# Patient Record
Sex: FEMALE | Race: WHITE
[De-identification: names, ages, dates, MRNs, and addresses within clinical notes are randomized per-mention and may not be internally consistent; named-entity substitution may affect disease eponyms.]

---

## 2018-02-21 ENCOUNTER — HOSPITAL ENCOUNTER (EMERGENCY)
Dept: HOSPITAL 94 - ER | Age: 38
Discharge: HOME | End: 2018-02-21
Payer: MEDICARE

## 2018-02-21 VITALS — BODY MASS INDEX: 36.73 KG/M2 | HEIGHT: 65 IN | WEIGHT: 220.46 LBS

## 2018-02-21 VITALS — SYSTOLIC BLOOD PRESSURE: 120 MMHG | DIASTOLIC BLOOD PRESSURE: 72 MMHG

## 2018-02-21 DIAGNOSIS — Z88.1: ICD-10-CM

## 2018-02-21 DIAGNOSIS — R33.9: ICD-10-CM

## 2018-02-21 DIAGNOSIS — K21.9: ICD-10-CM

## 2018-02-21 DIAGNOSIS — N18.9: ICD-10-CM

## 2018-02-21 DIAGNOSIS — K59.00: Primary | ICD-10-CM

## 2018-02-21 DIAGNOSIS — Z88.8: ICD-10-CM

## 2018-02-21 DIAGNOSIS — Z88.6: ICD-10-CM

## 2018-02-21 DIAGNOSIS — Z88.0: ICD-10-CM

## 2018-02-21 LAB
ALBUMIN SERPL BCP-MCNC: 3.6 G/DL (ref 3.4–5)
ANION GAP SERPL CALCULATED.3IONS-SCNC: 7 MMOL/L (ref 8–16)
BASOPHILS # BLD AUTO: 0.1 X10'3 (ref 0–0.2)
BASOPHILS NFR BLD AUTO: 0.9 % (ref 0–1)
BUN SERPL-MCNC: 15 MG/DL (ref 7–18)
BUN/CREAT SERPL: 16.3 (ref 6.6–38)
CALCIUM SERPL-MCNC: 8.9 MG/DL (ref 8.5–10.1)
CHLORIDE SERPL-SCNC: 104 MMOL/L (ref 99–107)
CLARITY UR: CLEAR
CO2 SERPL-SCNC: 30.3 MMOL/L (ref 24–32)
COLOR UR: YELLOW
CREAT SERPL-MCNC: 0.92 MG/DL (ref 0.4–0.9)
EOSINOPHIL # BLD AUTO: 0.3 X10'3 (ref 0–0.9)
EOSINOPHIL NFR BLD AUTO: 3.7 % (ref 0–6)
ERYTHROCYTE [DISTWIDTH] IN BLOOD BY AUTOMATED COUNT: 13.7 % (ref 11.5–14.5)
GFR SERPL CREATININE-BSD FRML MDRD: 68 ML/MIN
GLUCOSE SERPL-MCNC: 101 MG/DL (ref 70–104)
GLUCOSE UR STRIP-MCNC: NEGATIVE MG/DL
HCG UR QL: NEGATIVE
HCT VFR BLD AUTO: 39.8 % (ref 35–45)
HGB BLD-MCNC: 13.4 G/DL (ref 12–16)
HGB UR QL STRIP: NEGATIVE
KETONES UR STRIP-MCNC: NEGATIVE MG/DL
LEUKOCYTE ESTERASE UR QL STRIP: NEGATIVE
LYMPHOCYTES # BLD AUTO: 2.8 X10'3 (ref 1.1–4.8)
LYMPHOCYTES NFR BLD AUTO: 36.1 % (ref 21–51)
MCH RBC QN AUTO: 28.6 PG (ref 27–31)
MCHC RBC AUTO-ENTMCNC: 33.8 % (ref 33–36.5)
MCV RBC AUTO: 84.7 FL (ref 78–98)
MONOCYTES # BLD AUTO: 0.7 X10'3 (ref 0–0.9)
MONOCYTES NFR BLD AUTO: 9.6 % (ref 2–12)
NEUTROPHILS # BLD AUTO: 3.8 X10'3 (ref 1.8–7.7)
NEUTROPHILS NFR BLD AUTO: 49.7 % (ref 42–75)
NITRITE UR QL STRIP: NEGATIVE
PH UR STRIP: 6.5 [PH] (ref 4.8–8)
PLATELET # BLD AUTO: 264 X10'3 (ref 140–440)
PMV BLD AUTO: 8.4 FL (ref 7.4–10.4)
POTASSIUM SERPL-SCNC: 3.6 MMOL/L (ref 3.5–5.1)
PROT UR QL STRIP: NEGATIVE MG/DL
RBC # BLD AUTO: 4.7 X10'6 (ref 4.2–5.6)
SODIUM SERPL-SCNC: 141 MMOL/L (ref 135–145)
SP GR UR STRIP: 1.01 (ref 1–1.03)
URN COLLECT METHOD CLASS: (no result)
UROBILINOGEN UR STRIP-MCNC: 0.2 E.U/DL (ref 0.2–1)
WBC # BLD AUTO: 7.7 X10'3 (ref 4.5–11)

## 2018-02-21 PROCEDURE — 74177 CT ABD & PELVIS W/CONTRAST: CPT

## 2018-02-21 PROCEDURE — 85025 COMPLETE CBC W/AUTO DIFF WBC: CPT

## 2018-02-21 PROCEDURE — 81003 URINALYSIS AUTO W/O SCOPE: CPT

## 2018-02-21 PROCEDURE — 81025 URINE PREGNANCY TEST: CPT

## 2018-02-21 PROCEDURE — 80048 BASIC METABOLIC PNL TOTAL CA: CPT

## 2018-02-21 PROCEDURE — 96360 HYDRATION IV INFUSION INIT: CPT

## 2018-02-21 PROCEDURE — 36415 COLL VENOUS BLD VENIPUNCTURE: CPT

## 2018-02-21 PROCEDURE — 99285 EMERGENCY DEPT VISIT HI MDM: CPT

## 2018-02-21 PROCEDURE — 76856 US EXAM PELVIC COMPLETE: CPT

## 2018-02-21 PROCEDURE — 51702 INSERT TEMP BLADDER CATH: CPT

## 2018-04-10 ENCOUNTER — HOSPITAL ENCOUNTER (EMERGENCY)
Dept: HOSPITAL 94 - ER | Age: 38
Discharge: HOME | End: 2018-04-10
Payer: MEDICARE

## 2018-04-10 VITALS — DIASTOLIC BLOOD PRESSURE: 85 MMHG | SYSTOLIC BLOOD PRESSURE: 143 MMHG

## 2018-04-10 VITALS — BODY MASS INDEX: 38.4 KG/M2 | WEIGHT: 230.49 LBS | HEIGHT: 65 IN

## 2018-04-10 DIAGNOSIS — Z88.2: ICD-10-CM

## 2018-04-10 DIAGNOSIS — R42: ICD-10-CM

## 2018-04-10 DIAGNOSIS — Z98.890: ICD-10-CM

## 2018-04-10 DIAGNOSIS — Z88.8: ICD-10-CM

## 2018-04-10 DIAGNOSIS — F41.9: ICD-10-CM

## 2018-04-10 DIAGNOSIS — Z79.899: ICD-10-CM

## 2018-04-10 DIAGNOSIS — K21.9: ICD-10-CM

## 2018-04-10 DIAGNOSIS — J32.9: Primary | ICD-10-CM

## 2018-04-10 DIAGNOSIS — Z88.5: ICD-10-CM

## 2018-04-10 DIAGNOSIS — Z88.0: ICD-10-CM

## 2018-04-10 LAB
ALBUMIN SERPL BCP-MCNC: 3.4 G/DL (ref 3.4–5)
ALBUMIN/GLOB SERPL: 0.9 {RATIO} (ref 1.1–1.5)
ALP SERPL-CCNC: 69 IU/L (ref 46–116)
ALT SERPL W P-5'-P-CCNC: 37 U/L (ref 12–78)
ANION GAP SERPL CALCULATED.3IONS-SCNC: 9 MMOL/L (ref 8–16)
AST SERPL W P-5'-P-CCNC: 24 U/L (ref 10–37)
BASOPHILS # BLD AUTO: 0.1 X10'3 (ref 0–0.2)
BASOPHILS NFR BLD AUTO: 0.5 % (ref 0–1)
BILIRUB SERPL-MCNC: 0.2 MG/DL (ref 0.1–1)
BUN SERPL-MCNC: 14 MG/DL (ref 7–18)
BUN/CREAT SERPL: 16.5 (ref 6.6–38)
CALCIUM SERPL-MCNC: 8.4 MG/DL (ref 8.5–10.1)
CHLORIDE SERPL-SCNC: 103 MMOL/L (ref 99–107)
CO2 SERPL-SCNC: 26.6 MMOL/L (ref 24–32)
CREAT SERPL-MCNC: 0.85 MG/DL (ref 0.4–0.9)
EOSINOPHIL # BLD AUTO: 0.4 X10'3 (ref 0–0.9)
EOSINOPHIL NFR BLD AUTO: 3.4 % (ref 0–6)
ERYTHROCYTE [DISTWIDTH] IN BLOOD BY AUTOMATED COUNT: 14.3 % (ref 11.5–14.5)
GFR SERPL CREATININE-BSD FRML MDRD: 75 ML/MIN
GLUCOSE SERPL-MCNC: 90 MG/DL (ref 70–104)
HCT VFR BLD AUTO: 36 % (ref 35–45)
HGB BLD-MCNC: 12.6 G/DL (ref 12–16)
LYMPHOCYTES # BLD AUTO: 3 X10'3 (ref 1.1–4.8)
LYMPHOCYTES NFR BLD AUTO: 26.6 % (ref 21–51)
MCH RBC QN AUTO: 30 PG (ref 27–31)
MCHC RBC AUTO-ENTMCNC: 35 % (ref 33–36.5)
MCV RBC AUTO: 85.6 FL (ref 78–98)
MONOCYTES # BLD AUTO: 0.8 X10'3 (ref 0–0.9)
MONOCYTES NFR BLD AUTO: 7.4 % (ref 2–12)
NEUTROPHILS # BLD AUTO: 7 X10'3 (ref 1.8–7.7)
NEUTROPHILS NFR BLD AUTO: 62.1 % (ref 42–75)
PLATELET # BLD AUTO: 278 X10'3 (ref 140–440)
PMV BLD AUTO: 8.1 FL (ref 7.4–10.4)
POTASSIUM SERPL-SCNC: 3.7 MMOL/L (ref 3.5–5.1)
PROT SERPL-MCNC: 7.1 G/DL (ref 6.4–8.2)
RBC # BLD AUTO: 4.21 X10'6 (ref 4.2–5.6)
SODIUM SERPL-SCNC: 139 MMOL/L (ref 135–145)
WBC # BLD AUTO: 11.2 X10'3 (ref 4.5–11)

## 2018-04-10 PROCEDURE — 85025 COMPLETE CBC W/AUTO DIFF WBC: CPT

## 2018-04-10 PROCEDURE — 70491 CT SOFT TISSUE NECK W/DYE: CPT

## 2018-04-10 PROCEDURE — 70450 CT HEAD/BRAIN W/O DYE: CPT

## 2018-04-10 PROCEDURE — 99285 EMERGENCY DEPT VISIT HI MDM: CPT

## 2018-04-10 PROCEDURE — 36415 COLL VENOUS BLD VENIPUNCTURE: CPT

## 2018-04-10 PROCEDURE — 96374 THER/PROPH/DIAG INJ IV PUSH: CPT

## 2018-04-10 PROCEDURE — 80053 COMPREHEN METABOLIC PANEL: CPT

## 2018-04-17 ENCOUNTER — HOSPITAL ENCOUNTER (EMERGENCY)
Dept: HOSPITAL 94 - ER | Age: 38
Discharge: HOME | End: 2018-04-17
Payer: MEDICARE

## 2018-04-17 VITALS — SYSTOLIC BLOOD PRESSURE: 134 MMHG | DIASTOLIC BLOOD PRESSURE: 76 MMHG

## 2018-04-17 VITALS — WEIGHT: 236.12 LBS | HEIGHT: 65 IN | BODY MASS INDEX: 39.34 KG/M2

## 2018-04-17 DIAGNOSIS — Z88.8: ICD-10-CM

## 2018-04-17 DIAGNOSIS — Z88.2: ICD-10-CM

## 2018-04-17 DIAGNOSIS — J32.9: Primary | ICD-10-CM

## 2018-04-17 DIAGNOSIS — Z88.0: ICD-10-CM

## 2018-04-17 DIAGNOSIS — Z79.899: ICD-10-CM

## 2018-04-17 DIAGNOSIS — K21.9: ICD-10-CM

## 2018-04-17 DIAGNOSIS — H53.9: ICD-10-CM

## 2018-04-17 DIAGNOSIS — Z98.890: ICD-10-CM

## 2018-04-17 PROCEDURE — 99283 EMERGENCY DEPT VISIT LOW MDM: CPT

## 2018-05-01 ENCOUNTER — HOSPITAL ENCOUNTER (EMERGENCY)
Dept: HOSPITAL 94 - ER | Age: 38
Discharge: HOME | End: 2018-05-01
Payer: MEDICARE

## 2018-05-01 VITALS — WEIGHT: 231.49 LBS | HEIGHT: 65 IN | BODY MASS INDEX: 38.57 KG/M2

## 2018-05-01 VITALS — SYSTOLIC BLOOD PRESSURE: 127 MMHG | DIASTOLIC BLOOD PRESSURE: 81 MMHG

## 2018-05-01 DIAGNOSIS — Z98.890: ICD-10-CM

## 2018-05-01 DIAGNOSIS — K08.89: Primary | ICD-10-CM

## 2018-05-01 DIAGNOSIS — Z88.2: ICD-10-CM

## 2018-05-01 DIAGNOSIS — Z88.0: ICD-10-CM

## 2018-05-01 DIAGNOSIS — K21.9: ICD-10-CM

## 2018-05-01 DIAGNOSIS — Z79.899: ICD-10-CM

## 2018-05-01 PROCEDURE — 99283 EMERGENCY DEPT VISIT LOW MDM: CPT

## 2018-06-17 ENCOUNTER — HOSPITAL ENCOUNTER (EMERGENCY)
Dept: HOSPITAL 94 - ER | Age: 38
Discharge: HOME | End: 2018-06-17
Payer: MEDICARE

## 2018-06-17 VITALS — WEIGHT: 234.57 LBS | BODY MASS INDEX: 39.08 KG/M2 | HEIGHT: 65 IN

## 2018-06-17 DIAGNOSIS — Z79.899: ICD-10-CM

## 2018-06-17 DIAGNOSIS — Z88.2: ICD-10-CM

## 2018-06-17 DIAGNOSIS — Z98.890: ICD-10-CM

## 2018-06-17 DIAGNOSIS — Z79.2: ICD-10-CM

## 2018-06-17 DIAGNOSIS — Z88.5: ICD-10-CM

## 2018-06-17 DIAGNOSIS — Z88.8: ICD-10-CM

## 2018-06-17 DIAGNOSIS — Z88.0: ICD-10-CM

## 2018-06-17 DIAGNOSIS — Z88.1: ICD-10-CM

## 2018-06-17 DIAGNOSIS — R11.0: ICD-10-CM

## 2018-06-17 DIAGNOSIS — K08.89: Primary | ICD-10-CM

## 2018-06-17 DIAGNOSIS — K21.9: ICD-10-CM

## 2018-06-17 PROCEDURE — 96372 THER/PROPH/DIAG INJ SC/IM: CPT

## 2018-06-17 PROCEDURE — 99283 EMERGENCY DEPT VISIT LOW MDM: CPT

## 2018-11-02 ENCOUNTER — HOSPITAL ENCOUNTER (EMERGENCY)
Dept: HOSPITAL 94 - ER | Age: 38
Discharge: HOME | End: 2018-11-02
Payer: MEDICARE

## 2018-11-02 VITALS — BODY MASS INDEX: 38.93 KG/M2 | HEIGHT: 65 IN | WEIGHT: 233.69 LBS

## 2018-11-02 VITALS — DIASTOLIC BLOOD PRESSURE: 91 MMHG | SYSTOLIC BLOOD PRESSURE: 142 MMHG

## 2018-11-02 DIAGNOSIS — Z88.6: ICD-10-CM

## 2018-11-02 DIAGNOSIS — R42: Primary | ICD-10-CM

## 2018-11-02 DIAGNOSIS — Z98.890: ICD-10-CM

## 2018-11-02 DIAGNOSIS — Z88.2: ICD-10-CM

## 2018-11-02 DIAGNOSIS — Z88.1: ICD-10-CM

## 2018-11-02 DIAGNOSIS — K21.9: ICD-10-CM

## 2018-11-02 DIAGNOSIS — Z88.0: ICD-10-CM

## 2018-11-02 DIAGNOSIS — Z90.89: ICD-10-CM

## 2018-11-02 DIAGNOSIS — Z79.899: ICD-10-CM

## 2018-11-02 LAB
ALBUMIN SERPL BCP-MCNC: 3.2 G/DL (ref 3.4–5)
ALBUMIN/GLOB SERPL: 0.8 {RATIO} (ref 1.1–1.5)
ALP SERPL-CCNC: 77 IU/L (ref 46–116)
ALT SERPL W P-5'-P-CCNC: 47 U/L (ref 12–78)
ANION GAP SERPL CALCULATED.3IONS-SCNC: 8 MMOL/L (ref 8–16)
AST SERPL W P-5'-P-CCNC: 27 U/L (ref 10–37)
BASOPHILS # BLD AUTO: 0.1 X10'3 (ref 0–0.2)
BASOPHILS NFR BLD AUTO: 0.5 % (ref 0–1)
BILIRUB SERPL-MCNC: 0.1 MG/DL (ref 0.1–1)
BUN SERPL-MCNC: 16 MG/DL (ref 7–18)
BUN/CREAT SERPL: 17.6 (ref 6.6–38)
CALCIUM SERPL-MCNC: 8.6 MG/DL (ref 8.5–10.1)
CHLORIDE SERPL-SCNC: 104 MMOL/L (ref 99–107)
CLARITY UR: CLEAR
CO2 SERPL-SCNC: 28.7 MMOL/L (ref 24–32)
COLOR UR: YELLOW
CREAT SERPL-MCNC: 0.91 MG/DL (ref 0.4–0.9)
EOSINOPHIL # BLD AUTO: 0.3 X10'3 (ref 0–0.9)
EOSINOPHIL NFR BLD AUTO: 3.5 % (ref 0–6)
ERYTHROCYTE [DISTWIDTH] IN BLOOD BY AUTOMATED COUNT: 13.3 % (ref 11.5–14.5)
GFR SERPL CREATININE-BSD FRML MDRD: 69 ML/MIN
GLUCOSE SERPL-MCNC: 88 MG/DL (ref 70–104)
GLUCOSE UR STRIP-MCNC: NEGATIVE MG/DL
HCT VFR BLD AUTO: 38.8 % (ref 35–45)
HGB BLD-MCNC: 12.9 G/DL (ref 12–16)
HGB UR QL STRIP: NEGATIVE
KETONES UR STRIP-MCNC: (no result) MG/DL
LEUKOCYTE ESTERASE UR QL STRIP: NEGATIVE
LYMPHOCYTES # BLD AUTO: 2.9 X10'3 (ref 1.1–4.8)
LYMPHOCYTES NFR BLD AUTO: 30.9 % (ref 21–51)
MCH RBC QN AUTO: 29.1 PG (ref 27–31)
MCHC RBC AUTO-ENTMCNC: 33.2 % (ref 33–36.5)
MCV RBC AUTO: 87.8 FL (ref 78–98)
MONOCYTES # BLD AUTO: 0.7 X10'3 (ref 0–0.9)
MONOCYTES NFR BLD AUTO: 8 % (ref 2–12)
NEUTROPHILS # BLD AUTO: 5.3 X10'3 (ref 1.8–7.7)
NEUTROPHILS NFR BLD AUTO: 57.1 % (ref 42–75)
NITRITE UR QL STRIP: NEGATIVE
PH UR STRIP: 6.5 [PH] (ref 4.8–8)
PLATELET # BLD AUTO: 346 X10'3 (ref 140–440)
PMV BLD AUTO: 8 FL (ref 7.4–10.4)
POTASSIUM SERPL-SCNC: 3.5 MMOL/L (ref 3.5–5.1)
PROT SERPL-MCNC: 7.1 G/DL (ref 6.4–8.2)
PROT UR QL STRIP: NEGATIVE MG/DL
RBC # BLD AUTO: 4.42 X10'6 (ref 4.2–5.6)
SODIUM SERPL-SCNC: 141 MMOL/L (ref 135–145)
SP GR UR STRIP: 1.02 (ref 1–1.03)
URN COLLECT METHOD CLASS: (no result)
UROBILINOGEN UR STRIP-MCNC: 0.2 E.U/DL (ref 0.2–1)
WBC # BLD AUTO: 9.3 X10'3 (ref 4.5–11)

## 2018-11-02 PROCEDURE — 96361 HYDRATE IV INFUSION ADD-ON: CPT

## 2018-11-02 PROCEDURE — 80053 COMPREHEN METABOLIC PANEL: CPT

## 2018-11-02 PROCEDURE — 96374 THER/PROPH/DIAG INJ IV PUSH: CPT

## 2018-11-02 PROCEDURE — 71045 X-RAY EXAM CHEST 1 VIEW: CPT

## 2018-11-02 PROCEDURE — 81003 URINALYSIS AUTO W/O SCOPE: CPT

## 2018-11-02 PROCEDURE — 85025 COMPLETE CBC W/AUTO DIFF WBC: CPT

## 2018-11-02 PROCEDURE — 36415 COLL VENOUS BLD VENIPUNCTURE: CPT

## 2018-11-02 PROCEDURE — 99285 EMERGENCY DEPT VISIT HI MDM: CPT

## 2018-11-02 PROCEDURE — 70450 CT HEAD/BRAIN W/O DYE: CPT

## 2018-11-02 RX ADMIN — FENTANYL CITRATE ONE MCG: 50 INJECTION, SOLUTION INTRAMUSCULAR; INTRAVENOUS at 16:05

## 2018-11-02 RX ADMIN — FENTANYL CITRATE ONE MCG: 50 INJECTION, SOLUTION INTRAMUSCULAR; INTRAVENOUS at 15:56

## 2018-11-12 ENCOUNTER — HOSPITAL ENCOUNTER (EMERGENCY)
Dept: HOSPITAL 94 - ER | Age: 38
LOS: 1 days | Discharge: HOME | End: 2018-11-13
Payer: MEDICARE

## 2018-11-12 VITALS — BODY MASS INDEX: 39.24 KG/M2 | HEIGHT: 65 IN | WEIGHT: 235.5 LBS

## 2018-11-12 DIAGNOSIS — Z88.2: ICD-10-CM

## 2018-11-12 DIAGNOSIS — Z79.899: ICD-10-CM

## 2018-11-12 DIAGNOSIS — J01.90: Primary | ICD-10-CM

## 2018-11-12 DIAGNOSIS — Z88.1: ICD-10-CM

## 2018-11-12 DIAGNOSIS — Z98.890: ICD-10-CM

## 2018-11-12 DIAGNOSIS — Z90.89: ICD-10-CM

## 2018-11-12 DIAGNOSIS — Z88.0: ICD-10-CM

## 2018-11-12 DIAGNOSIS — Z88.6: ICD-10-CM

## 2018-11-12 DIAGNOSIS — K21.9: ICD-10-CM

## 2018-11-12 PROCEDURE — 96372 THER/PROPH/DIAG INJ SC/IM: CPT

## 2018-11-12 PROCEDURE — 99283 EMERGENCY DEPT VISIT LOW MDM: CPT

## 2018-11-13 VITALS — SYSTOLIC BLOOD PRESSURE: 169 MMHG | DIASTOLIC BLOOD PRESSURE: 105 MMHG

## 2018-11-25 ENCOUNTER — HOSPITAL ENCOUNTER (EMERGENCY)
Dept: HOSPITAL 94 - ER | Age: 38
Discharge: HOME | End: 2018-11-25
Payer: MEDICARE

## 2018-11-25 VITALS — BODY MASS INDEX: 38.57 KG/M2 | HEIGHT: 65 IN | WEIGHT: 231.49 LBS

## 2018-11-25 VITALS — SYSTOLIC BLOOD PRESSURE: 153 MMHG | DIASTOLIC BLOOD PRESSURE: 89 MMHG

## 2018-11-25 DIAGNOSIS — Z88.5: ICD-10-CM

## 2018-11-25 DIAGNOSIS — R51: Primary | ICD-10-CM

## 2018-11-25 DIAGNOSIS — Z88.1: ICD-10-CM

## 2018-11-25 DIAGNOSIS — Z88.2: ICD-10-CM

## 2018-11-25 DIAGNOSIS — Z88.0: ICD-10-CM

## 2018-11-25 DIAGNOSIS — Z88.8: ICD-10-CM

## 2018-11-25 DIAGNOSIS — Z79.899: ICD-10-CM

## 2018-11-25 DIAGNOSIS — Z79.2: ICD-10-CM

## 2018-11-25 DIAGNOSIS — K21.9: ICD-10-CM

## 2018-11-25 DIAGNOSIS — Z98.890: ICD-10-CM

## 2018-11-25 PROCEDURE — 99284 EMERGENCY DEPT VISIT MOD MDM: CPT

## 2019-01-07 ENCOUNTER — HOSPITAL ENCOUNTER (EMERGENCY)
Dept: HOSPITAL 94 - ER | Age: 39
Discharge: HOME | End: 2019-01-07
Payer: MEDICARE

## 2019-01-07 VITALS — HEIGHT: 65 IN | BODY MASS INDEX: 34.01 KG/M2 | WEIGHT: 204.15 LBS

## 2019-01-07 VITALS — DIASTOLIC BLOOD PRESSURE: 111 MMHG | SYSTOLIC BLOOD PRESSURE: 166 MMHG

## 2019-01-07 DIAGNOSIS — R51: Primary | ICD-10-CM

## 2019-01-07 DIAGNOSIS — Z88.0: ICD-10-CM

## 2019-01-07 DIAGNOSIS — Z98.890: ICD-10-CM

## 2019-01-07 DIAGNOSIS — K21.9: ICD-10-CM

## 2019-01-07 DIAGNOSIS — Z79.2: ICD-10-CM

## 2019-01-07 DIAGNOSIS — Z88.8: ICD-10-CM

## 2019-01-07 DIAGNOSIS — Z88.1: ICD-10-CM

## 2019-01-07 DIAGNOSIS — R11.0: ICD-10-CM

## 2019-01-07 DIAGNOSIS — Z79.899: ICD-10-CM

## 2019-01-07 DIAGNOSIS — Z88.5: ICD-10-CM

## 2019-01-07 DIAGNOSIS — Z88.2: ICD-10-CM

## 2019-01-07 PROCEDURE — 99284 EMERGENCY DEPT VISIT MOD MDM: CPT

## 2019-01-26 ENCOUNTER — HOSPITAL ENCOUNTER (EMERGENCY)
Dept: HOSPITAL 94 - ER | Age: 39
Discharge: HOME | End: 2019-01-26
Payer: MEDICARE

## 2019-01-26 VITALS — HEIGHT: 65 IN | WEIGHT: 231.93 LBS | BODY MASS INDEX: 38.64 KG/M2

## 2019-01-26 VITALS — SYSTOLIC BLOOD PRESSURE: 154 MMHG | DIASTOLIC BLOOD PRESSURE: 90 MMHG

## 2019-01-26 DIAGNOSIS — K21.9: ICD-10-CM

## 2019-01-26 DIAGNOSIS — Z98.890: ICD-10-CM

## 2019-01-26 DIAGNOSIS — Z88.6: ICD-10-CM

## 2019-01-26 DIAGNOSIS — Z88.0: ICD-10-CM

## 2019-01-26 DIAGNOSIS — F41.9: ICD-10-CM

## 2019-01-26 DIAGNOSIS — Z79.899: ICD-10-CM

## 2019-01-26 DIAGNOSIS — R06.02: Primary | ICD-10-CM

## 2019-01-26 DIAGNOSIS — Z88.1: ICD-10-CM

## 2019-01-26 DIAGNOSIS — Z90.89: ICD-10-CM

## 2019-01-26 DIAGNOSIS — Z88.5: ICD-10-CM

## 2019-01-26 LAB
ALBUMIN SERPL BCP-MCNC: 3.2 G/DL (ref 3.4–5)
ALBUMIN/GLOB SERPL: 0.8 {RATIO} (ref 1.1–1.5)
ALP SERPL-CCNC: 77 IU/L (ref 46–116)
ALT SERPL W P-5'-P-CCNC: 28 U/L (ref 12–78)
ANION GAP SERPL CALCULATED.3IONS-SCNC: 9 MMOL/L (ref 8–16)
AST SERPL W P-5'-P-CCNC: 19 U/L (ref 10–37)
BASOPHILS # BLD AUTO: 0 X10'3 (ref 0–0.2)
BASOPHILS NFR BLD AUTO: 0.3 % (ref 0–1)
BILIRUB SERPL-MCNC: 0.3 MG/DL (ref 0.1–1)
BUN SERPL-MCNC: 13 MG/DL (ref 7–18)
BUN/CREAT SERPL: 17.3 (ref 6.6–38)
CALCIUM SERPL-MCNC: 8.4 MG/DL (ref 8.5–10.1)
CHLORIDE SERPL-SCNC: 102 MMOL/L (ref 99–107)
CO2 SERPL-SCNC: 28.4 MMOL/L (ref 24–32)
CREAT SERPL-MCNC: 0.75 MG/DL (ref 0.4–0.9)
D DIMER PPP FEU-MCNC: 0.32 MG/L FEU (ref 0–0.5)
EOSINOPHIL # BLD AUTO: 0.2 X10'3 (ref 0–0.9)
EOSINOPHIL NFR BLD AUTO: 2.3 % (ref 0–6)
ERYTHROCYTE [DISTWIDTH] IN BLOOD BY AUTOMATED COUNT: 14.7 % (ref 11.5–14.5)
GFR SERPL CREATININE-BSD FRML MDRD: 86 ML/MIN
GLUCOSE SERPL-MCNC: 101 MG/DL (ref 70–104)
HCT VFR BLD AUTO: 39.4 % (ref 35–45)
HGB BLD-MCNC: 13.2 G/DL (ref 12–16)
LYMPHOCYTES # BLD AUTO: 2.3 X10'3 (ref 1.1–4.8)
LYMPHOCYTES NFR BLD AUTO: 24.8 % (ref 21–51)
MCH RBC QN AUTO: 29.3 PG (ref 27–31)
MCHC RBC AUTO-ENTMCNC: 33.5 % (ref 33–36.5)
MCV RBC AUTO: 87.4 FL (ref 78–98)
MONOCYTES # BLD AUTO: 0.6 X10'3 (ref 0–0.9)
MONOCYTES NFR BLD AUTO: 6.4 % (ref 2–12)
NEUTROPHILS # BLD AUTO: 6.2 X10'3 (ref 1.8–7.7)
NEUTROPHILS NFR BLD AUTO: 66.2 % (ref 42–75)
PLATELET # BLD AUTO: 296 X10'3 (ref 140–440)
PMV BLD AUTO: 8.4 FL (ref 7.4–10.4)
POTASSIUM SERPL-SCNC: 3.8 MMOL/L (ref 3.5–5.1)
PROT SERPL-MCNC: 7.4 G/DL (ref 6.4–8.2)
RBC # BLD AUTO: 4.5 X10'6 (ref 4.2–5.6)
SODIUM SERPL-SCNC: 139 MMOL/L (ref 135–145)
WBC # BLD AUTO: 9.4 X10'3 (ref 4.5–11)

## 2019-01-26 PROCEDURE — 85025 COMPLETE CBC W/AUTO DIFF WBC: CPT

## 2019-01-26 PROCEDURE — 71045 X-RAY EXAM CHEST 1 VIEW: CPT

## 2019-01-26 PROCEDURE — 85379 FIBRIN DEGRADATION QUANT: CPT

## 2019-01-26 PROCEDURE — 99284 EMERGENCY DEPT VISIT MOD MDM: CPT

## 2019-01-26 PROCEDURE — 84443 ASSAY THYROID STIM HORMONE: CPT

## 2019-01-26 PROCEDURE — 80053 COMPREHEN METABOLIC PANEL: CPT

## 2019-01-26 PROCEDURE — 84439 ASSAY OF FREE THYROXINE: CPT

## 2019-01-26 PROCEDURE — 96374 THER/PROPH/DIAG INJ IV PUSH: CPT

## 2019-01-26 PROCEDURE — 36415 COLL VENOUS BLD VENIPUNCTURE: CPT

## 2019-01-26 PROCEDURE — 93005 ELECTROCARDIOGRAM TRACING: CPT

## 2019-02-20 ENCOUNTER — HOSPITAL ENCOUNTER (OUTPATIENT)
Dept: HOSPITAL 94 - RAD | Age: 39
Discharge: HOME | End: 2019-02-20
Attending: OTOLARYNGOLOGY
Payer: MEDICARE

## 2019-02-20 DIAGNOSIS — K21.9: ICD-10-CM

## 2019-02-20 DIAGNOSIS — Z88.5: ICD-10-CM

## 2019-02-20 DIAGNOSIS — Z88.0: ICD-10-CM

## 2019-02-20 DIAGNOSIS — Z88.2: ICD-10-CM

## 2019-02-20 DIAGNOSIS — R13.14: Primary | ICD-10-CM

## 2019-02-20 PROCEDURE — 74230 X-RAY XM SWLNG FUNCJ C+: CPT

## 2019-02-20 PROCEDURE — 74220 X-RAY XM ESOPHAGUS 1CNTRST: CPT

## 2019-08-23 ENCOUNTER — HOSPITAL ENCOUNTER (EMERGENCY)
Dept: HOSPITAL 94 - ER | Age: 39
LOS: 1 days | Discharge: HOME | End: 2019-08-24
Payer: MEDICAID

## 2019-08-23 VITALS — HEIGHT: 65 IN | BODY MASS INDEX: 40.04 KG/M2 | WEIGHT: 240.3 LBS

## 2019-08-23 VITALS — DIASTOLIC BLOOD PRESSURE: 115 MMHG | SYSTOLIC BLOOD PRESSURE: 159 MMHG

## 2019-08-23 DIAGNOSIS — Z88.6: ICD-10-CM

## 2019-08-23 DIAGNOSIS — Z79.2: ICD-10-CM

## 2019-08-23 DIAGNOSIS — Z79.899: ICD-10-CM

## 2019-08-23 DIAGNOSIS — R06.02: ICD-10-CM

## 2019-08-23 DIAGNOSIS — Z88.0: ICD-10-CM

## 2019-08-23 DIAGNOSIS — J02.9: Primary | ICD-10-CM

## 2019-08-23 DIAGNOSIS — Z88.2: ICD-10-CM

## 2019-08-23 DIAGNOSIS — Z88.1: ICD-10-CM

## 2019-08-23 PROCEDURE — 99283 EMERGENCY DEPT VISIT LOW MDM: CPT

## 2019-08-26 ENCOUNTER — HOSPITAL ENCOUNTER (EMERGENCY)
Dept: HOSPITAL 94 - ER | Age: 39
Discharge: HOME | End: 2019-08-26
Payer: MEDICAID

## 2019-08-26 VITALS — SYSTOLIC BLOOD PRESSURE: 143 MMHG | DIASTOLIC BLOOD PRESSURE: 87 MMHG

## 2019-08-26 VITALS — BODY MASS INDEX: 48.82 KG/M2 | WEIGHT: 293 LBS | HEIGHT: 65 IN

## 2019-08-26 DIAGNOSIS — Z88.8: ICD-10-CM

## 2019-08-26 DIAGNOSIS — Z88.6: ICD-10-CM

## 2019-08-26 DIAGNOSIS — K21.9: ICD-10-CM

## 2019-08-26 DIAGNOSIS — Z88.0: ICD-10-CM

## 2019-08-26 DIAGNOSIS — Z88.1: ICD-10-CM

## 2019-08-26 DIAGNOSIS — B34.9: Primary | ICD-10-CM

## 2019-08-26 LAB
ALBUMIN SERPL BCP-MCNC: 3.5 G/DL (ref 3.4–5)
ALBUMIN/GLOB SERPL: 0.9 {RATIO} (ref 1.1–1.5)
ALP SERPL-CCNC: 72 IU/L (ref 46–116)
ALT SERPL W P-5'-P-CCNC: 41 U/L (ref 12–78)
AMYLASE SERPL-CCNC: 45 U/L (ref 25–115)
ANION GAP SERPL CALCULATED.3IONS-SCNC: 8 MMOL/L (ref 8–16)
AST SERPL W P-5'-P-CCNC: 23 U/L (ref 10–37)
BACTERIA URNS QL MICRO: (no result) /HPF
BASOPHILS # BLD AUTO: 0.1 X10'3 (ref 0–0.2)
BASOPHILS NFR BLD AUTO: 0.8 % (ref 0–1)
BILIRUB SERPL-MCNC: 0.2 MG/DL (ref 0.1–1)
BUN SERPL-MCNC: 13 MG/DL (ref 7–18)
BUN/CREAT SERPL: 12.7 (ref 6.6–38)
CALCIUM SERPL-MCNC: 8.9 MG/DL (ref 8.5–10.1)
CHLORIDE SERPL-SCNC: 107 MMOL/L (ref 99–107)
CLARITY UR: (no result)
CO2 SERPL-SCNC: 28.5 MMOL/L (ref 24–32)
COLOR UR: YELLOW
CREAT SERPL-MCNC: 1.02 MG/DL (ref 0.4–0.9)
DEPRECATED SQUAMOUS URNS QL MICRO: (no result) /LPF
EOSINOPHIL # BLD AUTO: 0.3 X10'3 (ref 0–0.9)
EOSINOPHIL NFR BLD AUTO: 3.9 % (ref 0–6)
ERYTHROCYTE [DISTWIDTH] IN BLOOD BY AUTOMATED COUNT: 13.8 % (ref 11.5–14.5)
GFR SERPL CREATININE-BSD FRML MDRD: 60 ML/MIN
GLUCOSE SERPL-MCNC: 109 MG/DL (ref 70–104)
GLUCOSE UR STRIP-MCNC: NEGATIVE MG/DL
HCT VFR BLD AUTO: 38.5 % (ref 35–45)
HGB BLD-MCNC: 13.1 G/DL (ref 12–16)
HGB UR QL STRIP: (no result)
KETONES UR STRIP-MCNC: (no result) MG/DL
LEUKOCYTE ESTERASE UR QL STRIP: NEGATIVE
LIPASE SERPL-CCNC: 131 U/L (ref 73–393)
LYMPHOCYTES # BLD AUTO: 1.9 X10'3 (ref 1.1–4.8)
LYMPHOCYTES NFR BLD AUTO: 28.6 % (ref 21–51)
MCH RBC QN AUTO: 29.5 PG (ref 27–31)
MCHC RBC AUTO-ENTMCNC: 33.9 G/DL (ref 33–36.5)
MCV RBC AUTO: 87 FL (ref 78–98)
MONOCYTES # BLD AUTO: 0.5 X10'3 (ref 0–0.9)
MONOCYTES NFR BLD AUTO: 7.9 % (ref 2–12)
MUCOUS THREADS URNS QL MICRO: (no result) /LPF
NEUTROPHILS # BLD AUTO: 4 X10'3 (ref 1.8–7.7)
NEUTROPHILS NFR BLD AUTO: 58.8 % (ref 42–75)
NITRITE UR QL STRIP: NEGATIVE
PH UR STRIP: 6.5 [PH] (ref 4.8–8)
PLATELET # BLD AUTO: 300 X10'3 (ref 140–440)
PMV BLD AUTO: 8 FL (ref 7.4–10.4)
POTASSIUM SERPL-SCNC: 3.6 MMOL/L (ref 3.5–5.1)
PROT SERPL-MCNC: 7.3 G/DL (ref 6.4–8.2)
PROT UR QL STRIP: (no result) MG/DL
RBC # BLD AUTO: 4.42 X10'6 (ref 4.2–5.6)
SODIUM SERPL-SCNC: 143 MMOL/L (ref 135–145)
SP GR UR STRIP: 1.01 (ref 1–1.03)
URN COLLECT METHOD CLASS: (no result)
UROBILINOGEN UR STRIP-MCNC: 1 E.U/DL (ref 0.2–1)
WBC # BLD AUTO: 6.8 X10'3 (ref 4.5–11)
WBC #/AREA URNS HPF: (no result) /HPF (ref 0–4)

## 2019-08-26 PROCEDURE — 87880 STREP A ASSAY W/OPTIC: CPT

## 2019-08-26 PROCEDURE — 85025 COMPLETE CBC W/AUTO DIFF WBC: CPT

## 2019-08-26 PROCEDURE — 80053 COMPREHEN METABOLIC PANEL: CPT

## 2019-08-26 PROCEDURE — 99283 EMERGENCY DEPT VISIT LOW MDM: CPT

## 2019-08-26 PROCEDURE — 71046 X-RAY EXAM CHEST 2 VIEWS: CPT

## 2019-08-26 PROCEDURE — 81001 URINALYSIS AUTO W/SCOPE: CPT

## 2019-08-26 PROCEDURE — 83690 ASSAY OF LIPASE: CPT

## 2019-08-26 PROCEDURE — 96361 HYDRATE IV INFUSION ADD-ON: CPT

## 2019-08-26 PROCEDURE — 36415 COLL VENOUS BLD VENIPUNCTURE: CPT

## 2019-08-26 PROCEDURE — 87081 CULTURE SCREEN ONLY: CPT

## 2019-08-26 PROCEDURE — 96374 THER/PROPH/DIAG INJ IV PUSH: CPT

## 2019-08-26 PROCEDURE — 93005 ELECTROCARDIOGRAM TRACING: CPT

## 2019-08-26 PROCEDURE — 82150 ASSAY OF AMYLASE: CPT

## 2019-08-26 PROCEDURE — 84484 ASSAY OF TROPONIN QUANT: CPT

## 2019-10-02 ENCOUNTER — HOSPITAL ENCOUNTER (EMERGENCY)
Dept: HOSPITAL 94 - ER | Age: 39
LOS: 1 days | Discharge: HOME | End: 2019-10-03
Payer: MEDICAID

## 2019-10-02 VITALS — SYSTOLIC BLOOD PRESSURE: 165 MMHG | DIASTOLIC BLOOD PRESSURE: 86 MMHG

## 2019-10-02 VITALS — HEIGHT: 65 IN | BODY MASS INDEX: 40.37 KG/M2 | WEIGHT: 242.29 LBS

## 2019-10-02 DIAGNOSIS — Z79.899: ICD-10-CM

## 2019-10-02 DIAGNOSIS — Z90.89: ICD-10-CM

## 2019-10-02 DIAGNOSIS — K21.9: ICD-10-CM

## 2019-10-02 DIAGNOSIS — Z88.5: ICD-10-CM

## 2019-10-02 DIAGNOSIS — Z88.2: ICD-10-CM

## 2019-10-02 DIAGNOSIS — R10.11: Primary | ICD-10-CM

## 2019-10-02 DIAGNOSIS — Z88.6: ICD-10-CM

## 2019-10-02 DIAGNOSIS — Z88.0: ICD-10-CM

## 2019-10-02 LAB
ALBUMIN SERPL BCP-MCNC: 3.6 G/DL (ref 3.4–5)
ALBUMIN/GLOB SERPL: 0.9 {RATIO} (ref 1.1–1.5)
ALP SERPL-CCNC: 69 IU/L (ref 46–116)
ALT SERPL W P-5'-P-CCNC: 51 U/L (ref 12–78)
ANION GAP SERPL CALCULATED.3IONS-SCNC: 6 MMOL/L (ref 8–16)
AST SERPL W P-5'-P-CCNC: 31 U/L (ref 10–37)
BACTERIA URNS QL MICRO: (no result) /HPF
BASOPHILS # BLD AUTO: 0.1 X10'3 (ref 0–0.2)
BASOPHILS NFR BLD AUTO: 0.7 % (ref 0–1)
BILIRUB SERPL-MCNC: 0.2 MG/DL (ref 0.1–1)
BUN SERPL-MCNC: 7 MG/DL (ref 7–18)
BUN/CREAT SERPL: 7.2 (ref 6.6–38)
CALCIUM SERPL-MCNC: 8.8 MG/DL (ref 8.5–10.1)
CHLORIDE SERPL-SCNC: 106 MMOL/L (ref 99–107)
CLARITY UR: (no result)
CO2 SERPL-SCNC: 29.6 MMOL/L (ref 24–32)
COLOR UR: YELLOW
CREAT SERPL-MCNC: 0.97 MG/DL (ref 0.4–0.9)
DEPRECATED SQUAMOUS URNS QL MICRO: (no result) /LPF
EOSINOPHIL # BLD AUTO: 0.2 X10'3 (ref 0–0.9)
EOSINOPHIL NFR BLD AUTO: 1.6 % (ref 0–6)
ERYTHROCYTE [DISTWIDTH] IN BLOOD BY AUTOMATED COUNT: 14.1 % (ref 11.5–14.5)
GFR SERPL CREATININE-BSD FRML MDRD: 64 ML/MIN
GLUCOSE SERPL-MCNC: 92 MG/DL (ref 70–104)
GLUCOSE UR STRIP-MCNC: NEGATIVE MG/DL
HCG UR QL: NEGATIVE
HCT VFR BLD AUTO: 40.7 % (ref 35–45)
HGB BLD-MCNC: 13.5 G/DL (ref 12–16)
HGB UR QL STRIP: NEGATIVE
KETONES UR STRIP-MCNC: NEGATIVE MG/DL
LEUKOCYTE ESTERASE UR QL STRIP: (no result)
LYMPHOCYTES # BLD AUTO: 2.7 X10'3 (ref 1.1–4.8)
LYMPHOCYTES NFR BLD AUTO: 24.1 % (ref 21–51)
MCH RBC QN AUTO: 29 PG (ref 27–31)
MCHC RBC AUTO-ENTMCNC: 33.2 G/DL (ref 33–36.5)
MCV RBC AUTO: 87.3 FL (ref 78–98)
MONOCYTES # BLD AUTO: 0.9 X10'3 (ref 0–0.9)
MONOCYTES NFR BLD AUTO: 8.2 % (ref 2–12)
MUCOUS THREADS URNS QL MICRO: (no result) /LPF
NEUTROPHILS # BLD AUTO: 7.4 X10'3 (ref 1.8–7.7)
NEUTROPHILS NFR BLD AUTO: 65.4 % (ref 42–75)
NITRITE UR QL STRIP: NEGATIVE
PH UR STRIP: 7 [PH] (ref 4.8–8)
PLATELET # BLD AUTO: 312 X10'3 (ref 140–440)
PMV BLD AUTO: 7.9 FL (ref 7.4–10.4)
POTASSIUM SERPL-SCNC: 3.4 MMOL/L (ref 3.5–5.1)
PROT SERPL-MCNC: 7.6 G/DL (ref 6.4–8.2)
PROT UR QL STRIP: NEGATIVE MG/DL
RBC # BLD AUTO: 4.66 X10'6 (ref 4.2–5.6)
RBC #/AREA URNS HPF: (no result) /HPF (ref 0–2)
RENAL EPI CELLS URNS QL MICRO: (no result) /HPF
SODIUM SERPL-SCNC: 142 MMOL/L (ref 135–145)
SP GR UR STRIP: 1.02 (ref 1–1.03)
URN COLLECT METHOD CLASS: (no result)
UROBILINOGEN UR STRIP-MCNC: 0.2 E.U/DL (ref 0.2–1)
WBC # BLD AUTO: 11.3 X10'3 (ref 4.5–11)
WBC #/AREA URNS HPF: (no result) /HPF (ref 0–4)

## 2019-10-02 PROCEDURE — 36415 COLL VENOUS BLD VENIPUNCTURE: CPT

## 2019-10-02 PROCEDURE — 99284 EMERGENCY DEPT VISIT MOD MDM: CPT

## 2019-10-02 PROCEDURE — 80053 COMPREHEN METABOLIC PANEL: CPT

## 2019-10-02 PROCEDURE — 85025 COMPLETE CBC W/AUTO DIFF WBC: CPT

## 2019-10-02 PROCEDURE — 87088 URINE BACTERIA CULTURE: CPT

## 2019-10-02 PROCEDURE — 81001 URINALYSIS AUTO W/SCOPE: CPT

## 2019-10-02 PROCEDURE — 74018 RADEX ABDOMEN 1 VIEW: CPT

## 2019-10-02 PROCEDURE — 74176 CT ABD & PELVIS W/O CONTRAST: CPT

## 2019-10-02 PROCEDURE — 85610 PROTHROMBIN TIME: CPT

## 2019-10-02 PROCEDURE — 81025 URINE PREGNANCY TEST: CPT

## 2019-10-02 PROCEDURE — 96374 THER/PROPH/DIAG INJ IV PUSH: CPT

## 2019-10-02 PROCEDURE — 96372 THER/PROPH/DIAG INJ SC/IM: CPT

## 2019-11-11 ENCOUNTER — HOSPITAL ENCOUNTER (EMERGENCY)
Dept: HOSPITAL 94 - ER | Age: 39
Discharge: HOME | End: 2019-11-11
Payer: MEDICAID

## 2019-11-11 VITALS — SYSTOLIC BLOOD PRESSURE: 150 MMHG | DIASTOLIC BLOOD PRESSURE: 101 MMHG

## 2019-11-11 VITALS — BODY MASS INDEX: 40.4 KG/M2 | HEIGHT: 65 IN | WEIGHT: 242.51 LBS

## 2019-11-11 DIAGNOSIS — Z88.0: ICD-10-CM

## 2019-11-11 DIAGNOSIS — Z79.899: ICD-10-CM

## 2019-11-11 DIAGNOSIS — Z88.1: ICD-10-CM

## 2019-11-11 DIAGNOSIS — J20.9: Primary | ICD-10-CM

## 2019-11-11 DIAGNOSIS — Z88.2: ICD-10-CM

## 2019-11-11 DIAGNOSIS — Z88.5: ICD-10-CM

## 2019-11-11 DIAGNOSIS — F41.9: ICD-10-CM

## 2019-11-11 PROCEDURE — 99283 EMERGENCY DEPT VISIT LOW MDM: CPT

## 2019-11-17 ENCOUNTER — HOSPITAL ENCOUNTER (EMERGENCY)
Dept: HOSPITAL 94 - ER | Age: 39
Discharge: HOME | End: 2019-11-17
Payer: MEDICAID

## 2019-11-17 VITALS — DIASTOLIC BLOOD PRESSURE: 87 MMHG | SYSTOLIC BLOOD PRESSURE: 137 MMHG

## 2019-11-17 VITALS — BODY MASS INDEX: 40.04 KG/M2 | HEIGHT: 65 IN | WEIGHT: 240.3 LBS

## 2019-11-17 DIAGNOSIS — Z88.0: ICD-10-CM

## 2019-11-17 DIAGNOSIS — J06.9: Primary | ICD-10-CM

## 2019-11-17 DIAGNOSIS — Z90.89: ICD-10-CM

## 2019-11-17 DIAGNOSIS — Z88.2: ICD-10-CM

## 2019-11-17 DIAGNOSIS — Z98.890: ICD-10-CM

## 2019-11-17 DIAGNOSIS — Z88.1: ICD-10-CM

## 2019-11-17 DIAGNOSIS — Z79.899: ICD-10-CM

## 2019-11-17 DIAGNOSIS — K21.9: ICD-10-CM

## 2019-11-17 LAB
ALBUMIN SERPL BCP-MCNC: 3.6 G/DL (ref 3.4–5)
ALBUMIN/GLOB SERPL: 1 {RATIO} (ref 1.1–1.5)
ALP SERPL-CCNC: 79 IU/L (ref 46–116)
ALT SERPL W P-5'-P-CCNC: 39 U/L (ref 12–78)
ANION GAP SERPL CALCULATED.3IONS-SCNC: 10 MMOL/L (ref 8–16)
AST SERPL W P-5'-P-CCNC: 24 U/L (ref 10–37)
BASOPHILS # BLD AUTO: 0.1 X10'3 (ref 0–0.2)
BASOPHILS NFR BLD AUTO: 0.9 % (ref 0–1)
BILIRUB SERPL-MCNC: 0.3 MG/DL (ref 0.1–1)
BUN SERPL-MCNC: 12 MG/DL (ref 7–18)
BUN/CREAT SERPL: 14.6 (ref 6.6–38)
CALCIUM SERPL-MCNC: 8.6 MG/DL (ref 8.5–10.1)
CHLORIDE SERPL-SCNC: 104 MMOL/L (ref 99–107)
CLARITY UR: CLEAR
CO2 SERPL-SCNC: 27 MMOL/L (ref 24–32)
COLOR UR: YELLOW
CREAT SERPL-MCNC: 0.82 MG/DL (ref 0.4–0.9)
EOSINOPHIL # BLD AUTO: 0.2 X10'3 (ref 0–0.9)
EOSINOPHIL NFR BLD AUTO: 2 % (ref 0–6)
ERYTHROCYTE [DISTWIDTH] IN BLOOD BY AUTOMATED COUNT: 14.2 % (ref 11.5–14.5)
GFR SERPL CREATININE-BSD FRML MDRD: 77 ML/MIN
GLUCOSE SERPL-MCNC: 95 MG/DL (ref 70–104)
GLUCOSE UR STRIP-MCNC: NEGATIVE MG/DL
HCG UR QL: NEGATIVE
HCT VFR BLD AUTO: 39.3 % (ref 35–45)
HGB BLD-MCNC: 13.4 G/DL (ref 12–16)
HGB UR QL STRIP: NEGATIVE
KETONES UR STRIP-MCNC: NEGATIVE MG/DL
LEUKOCYTE ESTERASE UR QL STRIP: NEGATIVE
LYMPHOCYTES # BLD AUTO: 2.8 X10'3 (ref 1.1–4.8)
LYMPHOCYTES NFR BLD AUTO: 33.4 % (ref 21–51)
MAGNESIUM SERPL-MCNC: 2 MG/DL (ref 1.5–2.4)
MCH RBC QN AUTO: 29.4 PG (ref 27–31)
MCHC RBC AUTO-ENTMCNC: 34.1 G/DL (ref 33–36.5)
MCV RBC AUTO: 86.1 FL (ref 78–98)
MONOCYTES # BLD AUTO: 0.7 X10'3 (ref 0–0.9)
MONOCYTES NFR BLD AUTO: 8.2 % (ref 2–12)
NEUTROPHILS # BLD AUTO: 4.6 X10'3 (ref 1.8–7.7)
NEUTROPHILS NFR BLD AUTO: 55.5 % (ref 42–75)
NITRITE UR QL STRIP: NEGATIVE
PH UR STRIP: 6 [PH] (ref 4.8–8)
PLATELET # BLD AUTO: 311 X10'3 (ref 140–440)
PMV BLD AUTO: 7.7 FL (ref 7.4–10.4)
POTASSIUM SERPL-SCNC: 3.3 MMOL/L (ref 3.5–5.1)
PROT SERPL-MCNC: 7.3 G/DL (ref 6.4–8.2)
PROT UR QL STRIP: NEGATIVE MG/DL
RBC # BLD AUTO: 4.56 X10'6 (ref 4.2–5.6)
SODIUM SERPL-SCNC: 141 MMOL/L (ref 135–145)
SP GR UR STRIP: 1.02 (ref 1–1.03)
URN COLLECT METHOD CLASS: (no result)
UROBILINOGEN UR STRIP-MCNC: 0.2 E.U/DL (ref 0.2–1)
WBC # BLD AUTO: 8.3 X10'3 (ref 4.5–11)

## 2019-11-17 PROCEDURE — 83880 ASSAY OF NATRIURETIC PEPTIDE: CPT

## 2019-11-17 PROCEDURE — 84145 PROCALCITONIN (PCT): CPT

## 2019-11-17 PROCEDURE — 83735 ASSAY OF MAGNESIUM: CPT

## 2019-11-17 PROCEDURE — 87040 BLOOD CULTURE FOR BACTERIA: CPT

## 2019-11-17 PROCEDURE — 83605 ASSAY OF LACTIC ACID: CPT

## 2019-11-17 PROCEDURE — 81003 URINALYSIS AUTO W/O SCOPE: CPT

## 2019-11-17 PROCEDURE — 93005 ELECTROCARDIOGRAM TRACING: CPT

## 2019-11-17 PROCEDURE — 85025 COMPLETE CBC W/AUTO DIFF WBC: CPT

## 2019-11-17 PROCEDURE — 81025 URINE PREGNANCY TEST: CPT

## 2019-11-17 PROCEDURE — 80053 COMPREHEN METABOLIC PANEL: CPT

## 2019-11-17 PROCEDURE — 71046 X-RAY EXAM CHEST 2 VIEWS: CPT

## 2019-11-17 PROCEDURE — 36415 COLL VENOUS BLD VENIPUNCTURE: CPT

## 2019-11-17 PROCEDURE — 99284 EMERGENCY DEPT VISIT MOD MDM: CPT

## 2020-02-12 ENCOUNTER — HOSPITAL ENCOUNTER (EMERGENCY)
Dept: HOSPITAL 94 - ER | Age: 40
Discharge: HOME | End: 2020-02-12
Payer: MEDICARE

## 2020-02-12 VITALS — BODY MASS INDEX: 40.07 KG/M2 | HEIGHT: 65 IN | WEIGHT: 240.52 LBS

## 2020-02-12 VITALS — SYSTOLIC BLOOD PRESSURE: 150 MMHG | DIASTOLIC BLOOD PRESSURE: 88 MMHG

## 2020-02-12 DIAGNOSIS — S02.5XXA: Primary | ICD-10-CM

## 2020-02-12 DIAGNOSIS — Z88.0: ICD-10-CM

## 2020-02-12 DIAGNOSIS — Z88.1: ICD-10-CM
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
## 2020-02-12 DIAGNOSIS — Z90.89: ICD-10-CM

## 2020-02-12 DIAGNOSIS — K21.9: ICD-10-CM
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
## 2020-02-12 DIAGNOSIS — Z88.2: ICD-10-CM

## 2020-02-12 DIAGNOSIS — Z98.890: ICD-10-CM

## 2020-02-12 DIAGNOSIS — Z88.6: ICD-10-CM

## 2020-02-12 DIAGNOSIS — Z88.5: ICD-10-CM

## 2020-02-12 PROCEDURE — 99283 EMERGENCY DEPT VISIT LOW MDM: CPT

## 2020-09-03 ENCOUNTER — HOSPITAL ENCOUNTER (EMERGENCY)
Dept: HOSPITAL 94 - ER | Age: 40
LOS: 1 days | Discharge: HOME | End: 2020-09-04
Payer: MEDICARE

## 2020-09-03 VITALS — BODY MASS INDEX: 39.3 KG/M2 | WEIGHT: 235.89 LBS | HEIGHT: 65 IN

## 2020-09-03 VITALS — DIASTOLIC BLOOD PRESSURE: 89 MMHG | SYSTOLIC BLOOD PRESSURE: 166 MMHG

## 2020-09-03 DIAGNOSIS — K29.70: Primary | ICD-10-CM

## 2020-09-03 DIAGNOSIS — R11.2: ICD-10-CM

## 2020-09-03 DIAGNOSIS — Z88.8: ICD-10-CM

## 2020-09-03 DIAGNOSIS — R10.11: ICD-10-CM

## 2020-09-03 DIAGNOSIS — Z98.890: ICD-10-CM

## 2020-09-03 DIAGNOSIS — Z79.899: ICD-10-CM

## 2020-09-03 DIAGNOSIS — Z88.2: ICD-10-CM

## 2020-09-03 DIAGNOSIS — Z88.0: ICD-10-CM

## 2020-09-03 DIAGNOSIS — F41.9: ICD-10-CM

## 2020-09-03 DIAGNOSIS — Z79.2: ICD-10-CM

## 2020-09-03 DIAGNOSIS — Z90.89: ICD-10-CM

## 2020-09-03 DIAGNOSIS — K21.9: ICD-10-CM

## 2020-09-03 LAB
ALBUMIN SERPL BCP-MCNC: 3.5 G/DL (ref 3.4–5)
ALBUMIN/GLOB SERPL: 0.9 {RATIO} (ref 1.1–1.5)
ALP SERPL-CCNC: 73 IU/L (ref 46–116)
ALT SERPL W P-5'-P-CCNC: 26 U/L (ref 12–78)
ANION GAP SERPL CALCULATED.3IONS-SCNC: 9 MMOL/L (ref 8–16)
AST SERPL W P-5'-P-CCNC: 19 U/L (ref 10–37)
BASOPHILS # BLD AUTO: 0.1 X10'3 (ref 0–0.2)
BASOPHILS NFR BLD AUTO: 0.8 % (ref 0–1)
BILIRUB SERPL-MCNC: 0.4 MG/DL (ref 0.1–1)
BUN SERPL-MCNC: 9 MG/DL (ref 7–18)
BUN/CREAT SERPL: 10.1 (ref 6.6–38)
CALCIUM SERPL-MCNC: 8.5 MG/DL (ref 8.5–10.1)
CHLORIDE SERPL-SCNC: 102 MMOL/L (ref 99–107)
CLARITY UR: CLEAR
CO2 SERPL-SCNC: 25.6 MMOL/L (ref 24–32)
COLOR UR: YELLOW
CREAT SERPL-MCNC: 0.89 MG/DL (ref 0.4–0.9)
EOSINOPHIL # BLD AUTO: 0.2 X10'3 (ref 0–0.9)
EOSINOPHIL NFR BLD AUTO: 1.9 % (ref 0–6)
ERYTHROCYTE [DISTWIDTH] IN BLOOD BY AUTOMATED COUNT: 13.4 % (ref 11.5–14.5)
GFR SERPL CREATININE-BSD FRML MDRD: 70 ML/MIN
GLUCOSE SERPL-MCNC: 84 MG/DL (ref 70–104)
GLUCOSE UR STRIP-MCNC: NEGATIVE MG/DL
HCG UR QL: NEGATIVE
HCT VFR BLD AUTO: 40.2 % (ref 35–45)
HGB BLD-MCNC: 13.5 G/DL (ref 12–16)
HGB UR QL STRIP: NEGATIVE
KETONES UR STRIP-MCNC: NEGATIVE MG/DL
LEUKOCYTE ESTERASE UR QL STRIP: NEGATIVE
LIPASE SERPL-CCNC: 108 U/L (ref 73–393)
LYMPHOCYTES # BLD AUTO: 3.3 X10'3 (ref 1.1–4.8)
LYMPHOCYTES NFR BLD AUTO: 28.9 % (ref 21–51)
MCH RBC QN AUTO: 29.8 PG (ref 27–31)
MCHC RBC AUTO-ENTMCNC: 33.6 G/DL (ref 33–36.5)
MCV RBC AUTO: 88.8 FL (ref 78–98)
MONOCYTES # BLD AUTO: 0.9 X10'3 (ref 0–0.9)
MONOCYTES NFR BLD AUTO: 8.2 % (ref 2–12)
NEUTROPHILS # BLD AUTO: 7 X10'3 (ref 1.8–7.7)
NEUTROPHILS NFR BLD AUTO: 60.2 % (ref 42–75)
NITRITE UR QL STRIP: NEGATIVE
PH UR STRIP: 6 [PH] (ref 4.8–8)
PLATELET # BLD AUTO: 293 X10'3 (ref 140–440)
PMV BLD AUTO: 8.1 FL (ref 7.4–10.4)
POTASSIUM SERPL-SCNC: 3.7 MMOL/L (ref 3.5–5.1)
PROT SERPL-MCNC: 7.4 G/DL (ref 6.4–8.2)
PROT UR QL STRIP: NEGATIVE MG/DL
RBC # BLD AUTO: 4.52 X10'6 (ref 4.2–5.6)
SODIUM SERPL-SCNC: 137 MMOL/L (ref 135–145)
SP GR UR STRIP: 1.02 (ref 1–1.03)
URN COLLECT METHOD CLASS: (no result)
UROBILINOGEN UR STRIP-MCNC: 0.2 E.U/DL (ref 0.2–1)
WBC # BLD AUTO: 11.6 X10'3 (ref 4.5–11)

## 2020-09-03 PROCEDURE — 81003 URINALYSIS AUTO W/O SCOPE: CPT

## 2020-09-03 PROCEDURE — 36415 COLL VENOUS BLD VENIPUNCTURE: CPT

## 2020-09-03 PROCEDURE — 81025 URINE PREGNANCY TEST: CPT

## 2020-09-03 PROCEDURE — 96374 THER/PROPH/DIAG INJ IV PUSH: CPT

## 2020-09-03 PROCEDURE — 99284 EMERGENCY DEPT VISIT MOD MDM: CPT

## 2020-09-03 PROCEDURE — 96361 HYDRATE IV INFUSION ADD-ON: CPT

## 2020-09-03 PROCEDURE — 96375 TX/PRO/DX INJ NEW DRUG ADDON: CPT

## 2020-09-03 PROCEDURE — 85025 COMPLETE CBC W/AUTO DIFF WBC: CPT

## 2020-09-03 PROCEDURE — 80053 COMPREHEN METABOLIC PANEL: CPT

## 2020-09-03 PROCEDURE — 76700 US EXAM ABDOM COMPLETE: CPT

## 2020-09-03 PROCEDURE — 83690 ASSAY OF LIPASE: CPT

## 2020-10-16 ENCOUNTER — HOSPITAL ENCOUNTER (EMERGENCY)
Dept: HOSPITAL 94 - ER | Age: 40
Discharge: HOME | End: 2020-10-16
Payer: MEDICARE

## 2020-10-16 VITALS — DIASTOLIC BLOOD PRESSURE: 100 MMHG | SYSTOLIC BLOOD PRESSURE: 158 MMHG

## 2020-10-16 VITALS — BODY MASS INDEX: 39.24 KG/M2 | WEIGHT: 235.5 LBS | HEIGHT: 65 IN

## 2020-10-16 DIAGNOSIS — Z98.890: ICD-10-CM

## 2020-10-16 DIAGNOSIS — Z88.2: ICD-10-CM

## 2020-10-16 DIAGNOSIS — Z79.899: ICD-10-CM

## 2020-10-16 DIAGNOSIS — Z79.2: ICD-10-CM

## 2020-10-16 DIAGNOSIS — Z90.49: ICD-10-CM

## 2020-10-16 DIAGNOSIS — R10.13: Primary | ICD-10-CM

## 2020-10-16 DIAGNOSIS — K21.9: ICD-10-CM

## 2020-10-16 DIAGNOSIS — F41.9: ICD-10-CM

## 2020-10-16 DIAGNOSIS — Z88.0: ICD-10-CM

## 2020-10-16 DIAGNOSIS — Z90.89: ICD-10-CM

## 2020-10-16 LAB
ALBUMIN SERPL BCP-MCNC: 3.7 G/DL (ref 3.4–5)
ALBUMIN/GLOB SERPL: 0.9 {RATIO} (ref 1.1–1.5)
ALP SERPL-CCNC: 78 IU/L (ref 46–116)
ALT SERPL W P-5'-P-CCNC: 33 U/L (ref 12–78)
ANION GAP SERPL CALCULATED.3IONS-SCNC: 6 MMOL/L (ref 8–16)
AST SERPL W P-5'-P-CCNC: 19 U/L (ref 10–37)
BASOPHILS # BLD AUTO: 0.1 X10'3 (ref 0–0.2)
BASOPHILS NFR BLD AUTO: 0.8 % (ref 0–1)
BILIRUB SERPL-MCNC: 0.3 MG/DL (ref 0.1–1)
BUN SERPL-MCNC: 13 MG/DL (ref 7–18)
BUN/CREAT SERPL: 15.7 (ref 6.6–38)
CALCIUM SERPL-MCNC: 8.8 MG/DL (ref 8.5–10.1)
CHLORIDE SERPL-SCNC: 105 MMOL/L (ref 99–107)
CO2 SERPL-SCNC: 30 MMOL/L (ref 24–32)
CREAT SERPL-MCNC: 0.83 MG/DL (ref 0.4–0.9)
EOSINOPHIL # BLD AUTO: 0.3 X10'3 (ref 0–0.9)
EOSINOPHIL NFR BLD AUTO: 2.5 % (ref 0–6)
ERYTHROCYTE [DISTWIDTH] IN BLOOD BY AUTOMATED COUNT: 13.5 % (ref 11.5–14.5)
GFR SERPL CREATININE-BSD FRML MDRD: 76 ML/MIN
GLUCOSE SERPL-MCNC: 92 MG/DL (ref 70–104)
HCT VFR BLD AUTO: 40.9 % (ref 35–45)
HGB BLD-MCNC: 13.7 G/DL (ref 12–16)
LIPASE SERPL-CCNC: 162 U/L (ref 73–393)
LYMPHOCYTES # BLD AUTO: 2.6 X10'3 (ref 1.1–4.8)
LYMPHOCYTES NFR BLD AUTO: 24.7 % (ref 21–51)
MCH RBC QN AUTO: 29.4 PG (ref 27–31)
MCHC RBC AUTO-ENTMCNC: 33.5 G/DL (ref 33–36.5)
MCV RBC AUTO: 87.8 FL (ref 78–98)
MONOCYTES # BLD AUTO: 0.9 X10'3 (ref 0–0.9)
MONOCYTES NFR BLD AUTO: 8.5 % (ref 2–12)
NEUTROPHILS # BLD AUTO: 6.6 X10'3 (ref 1.8–7.7)
NEUTROPHILS NFR BLD AUTO: 63.5 % (ref 42–75)
PLATELET # BLD AUTO: 329 X10'3 (ref 140–440)
PMV BLD AUTO: 8.3 FL (ref 7.4–10.4)
POTASSIUM SERPL-SCNC: 3.5 MMOL/L (ref 3.5–5.1)
PROT SERPL-MCNC: 7.8 G/DL (ref 6.4–8.2)
RBC # BLD AUTO: 4.65 X10'6 (ref 4.2–5.6)
SODIUM SERPL-SCNC: 141 MMOL/L (ref 135–145)
WBC # BLD AUTO: 10.4 X10'3 (ref 4.5–11)

## 2020-10-16 PROCEDURE — 83690 ASSAY OF LIPASE: CPT

## 2020-10-16 PROCEDURE — 99285 EMERGENCY DEPT VISIT HI MDM: CPT

## 2020-10-16 PROCEDURE — 96374 THER/PROPH/DIAG INJ IV PUSH: CPT

## 2020-10-16 PROCEDURE — 80053 COMPREHEN METABOLIC PANEL: CPT

## 2020-10-16 PROCEDURE — 36415 COLL VENOUS BLD VENIPUNCTURE: CPT

## 2020-10-16 PROCEDURE — 96361 HYDRATE IV INFUSION ADD-ON: CPT

## 2020-10-16 PROCEDURE — 96375 TX/PRO/DX INJ NEW DRUG ADDON: CPT

## 2020-10-16 PROCEDURE — 85025 COMPLETE CBC W/AUTO DIFF WBC: CPT

## 2020-10-16 PROCEDURE — 71260 CT THORAX DX C+: CPT

## 2020-10-16 PROCEDURE — 71045 X-RAY EXAM CHEST 1 VIEW: CPT

## 2021-07-07 LAB
ALBUMIN SERPL BCP-MCNC: 3.5 G/DL (ref 3.4–5)
ALBUMIN/GLOB SERPL: 0.9 {RATIO} (ref 1.1–1.5)
ALP SERPL-CCNC: 73 IU/L (ref 46–116)
ALT SERPL W P-5'-P-CCNC: 24 U/L (ref 30–65)
ANION GAP SERPL CALCULATED.3IONS-SCNC: 8 MMOL/L (ref 8–16)
APTT PPP: 26 SECONDS (ref 22–32)
AST SERPL W P-5'-P-CCNC: 16 U/L (ref 10–37)
BASOPHILS # BLD AUTO: 0.1 X10'3 (ref 0–0.2)
BASOPHILS NFR BLD AUTO: 0.4 % (ref 0–1)
BILIRUB SERPL-MCNC: 0.3 MG/DL (ref 0–1)
BUN SERPL-MCNC: 17 MG/DL (ref 7–18)
BUN/CREAT SERPL: 17 (ref 6.6–38)
CALCIUM SERPL-MCNC: 8.8 MG/DL (ref 8.5–10.1)
CHLORIDE SERPL-SCNC: 102 MMOL/L (ref 99–107)
CO2 SERPL-SCNC: 29.9 MMOL/L (ref 24–32)
CREAT SERPL-MCNC: 1 MG/DL (ref 0.4–0.9)
EOSINOPHIL # BLD AUTO: 0.1 X10'3 (ref 0–0.9)
EOSINOPHIL NFR BLD AUTO: 0.8 % (ref 0–6)
ERYTHROCYTE [DISTWIDTH] IN BLOOD BY AUTOMATED COUNT: 14.7 % (ref 11.5–14.5)
GFR SERPL CREATININE-BSD FRML MDRD: 61 ML/MIN
GLUCOSE SERPL-MCNC: 76 MG/DL (ref 70–104)
HCG SERPL QL: NEGATIVE
HCT VFR BLD AUTO: 39.8 % (ref 35–45)
HGB BLD-MCNC: 13.5 G/DL (ref 12–16)
INR PPP: 1 INR
LYMPHOCYTES # BLD AUTO: 3.6 X10'3 (ref 1.1–4.8)
LYMPHOCYTES NFR BLD AUTO: 22.6 % (ref 21–51)
MCH RBC QN AUTO: 29.1 PG (ref 27–31)
MCHC RBC AUTO-ENTMCNC: 33.9 G/DL (ref 33–36.5)
MCV RBC AUTO: 85.8 FL (ref 78–98)
MONOCYTES # BLD AUTO: 1.5 X10'3 (ref 0–0.9)
MONOCYTES NFR BLD AUTO: 9.6 % (ref 2–12)
NEUTROPHILS # BLD AUTO: 10.5 X10'3 (ref 1.8–7.7)
NEUTROPHILS NFR BLD AUTO: 66.6 % (ref 42–75)
PLATELET # BLD AUTO: 369 X10'3 (ref 140–440)
PLATELET FUNCTION (ADP): (no result) SECONDS (ref 63–104)
PMV BLD AUTO: 7.8 FL (ref 7.4–10.4)
POTASSIUM SERPL-SCNC: 3.6 MMOL/L (ref 3.4–5.1)
PROT SERPL-MCNC: 7.5 G/DL (ref 6.4–8.2)
PROTHROMBIN TIME: 10.3 SECONDS (ref 9–12)
RBC # BLD AUTO: 4.64 X10'6 (ref 4.2–5.6)
SODIUM SERPL-SCNC: 140 MMOL/L (ref 135–145)

## 2021-07-13 ENCOUNTER — HOSPITAL ENCOUNTER (OUTPATIENT)
Dept: HOSPITAL 94 - PAS | Age: 41
Discharge: HOME | End: 2021-07-13
Attending: OTOLARYNGOLOGY
Payer: COMMERCIAL

## 2021-07-13 ENCOUNTER — HOSPITAL ENCOUNTER (EMERGENCY)
Dept: HOSPITAL 94 - ER | Age: 41
Discharge: HOME | End: 2021-07-13
Payer: COMMERCIAL

## 2021-07-13 VITALS — DIASTOLIC BLOOD PRESSURE: 82 MMHG | SYSTOLIC BLOOD PRESSURE: 147 MMHG

## 2021-07-13 VITALS — DIASTOLIC BLOOD PRESSURE: 90 MMHG | SYSTOLIC BLOOD PRESSURE: 154 MMHG

## 2021-07-13 VITALS — SYSTOLIC BLOOD PRESSURE: 148 MMHG | DIASTOLIC BLOOD PRESSURE: 78 MMHG

## 2021-07-13 VITALS — HEIGHT: 65 IN | BODY MASS INDEX: 39.15 KG/M2 | WEIGHT: 235 LBS

## 2021-07-13 VITALS — DIASTOLIC BLOOD PRESSURE: 80 MMHG | SYSTOLIC BLOOD PRESSURE: 149 MMHG

## 2021-07-13 VITALS — DIASTOLIC BLOOD PRESSURE: 95 MMHG | SYSTOLIC BLOOD PRESSURE: 147 MMHG

## 2021-07-13 VITALS — DIASTOLIC BLOOD PRESSURE: 83 MMHG | SYSTOLIC BLOOD PRESSURE: 151 MMHG

## 2021-07-13 VITALS — HEIGHT: 65 IN | BODY MASS INDEX: 45.91 KG/M2 | WEIGHT: 275.58 LBS

## 2021-07-13 VITALS — DIASTOLIC BLOOD PRESSURE: 80 MMHG | SYSTOLIC BLOOD PRESSURE: 146 MMHG

## 2021-07-13 VITALS — SYSTOLIC BLOOD PRESSURE: 150 MMHG | DIASTOLIC BLOOD PRESSURE: 90 MMHG

## 2021-07-13 VITALS — SYSTOLIC BLOOD PRESSURE: 175 MMHG | DIASTOLIC BLOOD PRESSURE: 102 MMHG

## 2021-07-13 VITALS — SYSTOLIC BLOOD PRESSURE: 147 MMHG | DIASTOLIC BLOOD PRESSURE: 80 MMHG

## 2021-07-13 VITALS — DIASTOLIC BLOOD PRESSURE: 84 MMHG | SYSTOLIC BLOOD PRESSURE: 143 MMHG

## 2021-07-13 DIAGNOSIS — Z88.2: ICD-10-CM

## 2021-07-13 DIAGNOSIS — G89.18: Primary | ICD-10-CM

## 2021-07-13 DIAGNOSIS — Z79.2: ICD-10-CM

## 2021-07-13 DIAGNOSIS — Z79.899: ICD-10-CM

## 2021-07-13 DIAGNOSIS — Z88.1: ICD-10-CM

## 2021-07-13 DIAGNOSIS — E66.9: ICD-10-CM

## 2021-07-13 DIAGNOSIS — Z87.891: ICD-10-CM

## 2021-07-13 DIAGNOSIS — Z88.0: ICD-10-CM

## 2021-07-13 DIAGNOSIS — K21.9: ICD-10-CM

## 2021-07-13 DIAGNOSIS — I10: ICD-10-CM

## 2021-07-13 DIAGNOSIS — J33.8: ICD-10-CM

## 2021-07-13 DIAGNOSIS — Z88.5: ICD-10-CM

## 2021-07-13 DIAGNOSIS — F43.10: ICD-10-CM

## 2021-07-13 DIAGNOSIS — F41.9: ICD-10-CM

## 2021-07-13 DIAGNOSIS — Z98.890: ICD-10-CM

## 2021-07-13 DIAGNOSIS — Z90.89: ICD-10-CM

## 2021-07-13 DIAGNOSIS — Z88.8: ICD-10-CM

## 2021-07-13 DIAGNOSIS — M19.90: ICD-10-CM

## 2021-07-13 DIAGNOSIS — F32.9: ICD-10-CM

## 2021-07-13 DIAGNOSIS — J32.8: Primary | ICD-10-CM

## 2021-07-13 DIAGNOSIS — Z79.01: ICD-10-CM

## 2021-07-13 PROCEDURE — 87075 CULTR BACTERIA EXCEPT BLOOD: CPT

## 2021-07-13 PROCEDURE — 61782 SCAN PROC CRANIAL EXTRA: CPT

## 2021-07-13 PROCEDURE — 36415 COLL VENOUS BLD VENIPUNCTURE: CPT

## 2021-07-13 PROCEDURE — 99283 EMERGENCY DEPT VISIT LOW MDM: CPT

## 2021-07-13 PROCEDURE — 88304 TISSUE EXAM BY PATHOLOGIST: CPT

## 2021-07-13 PROCEDURE — 85610 PROTHROMBIN TIME: CPT

## 2021-07-13 PROCEDURE — 93005 ELECTROCARDIOGRAM TRACING: CPT

## 2021-07-13 PROCEDURE — 85025 COMPLETE CBC W/AUTO DIFF WBC: CPT

## 2021-07-13 PROCEDURE — 31267 ENDOSCOPY MAXILLARY SINUS: CPT

## 2021-07-13 PROCEDURE — 88311 DECALCIFY TISSUE: CPT

## 2021-07-13 PROCEDURE — 31259 NSL/SINS NDSC SPHN TISS RMVL: CPT

## 2021-07-13 PROCEDURE — 85730 THROMBOPLASTIN TIME PARTIAL: CPT

## 2021-07-13 PROCEDURE — 84703 CHORIONIC GONADOTROPIN ASSAY: CPT

## 2021-07-13 PROCEDURE — 87070 CULTURE OTHR SPECIMN AEROBIC: CPT

## 2021-07-13 PROCEDURE — 31253 NSL/SINS NDSC TOTAL: CPT

## 2021-07-13 PROCEDURE — 82948 REAGENT STRIP/BLOOD GLUCOSE: CPT

## 2021-07-13 PROCEDURE — 80053 COMPREHEN METABOLIC PANEL: CPT

## 2021-07-13 PROCEDURE — 85576 BLOOD PLATELET AGGREGATION: CPT

## 2021-07-13 RX ADMIN — Medication ONE SPR: at 07:26

## 2021-07-13 RX ADMIN — Medication ONE SPR: at 09:08

## 2021-07-13 NOTE — NUR
PT AWAKE AND STATES SHE IS IN EXCRUCIATING PAIN, DEMEROL ORDERED REFUSED INSISTS ON #1 
FENTANYL, #2 DEMEROL.  DR. TRIP NOTIFIED ORDER FOR FENTANYL GIVEN, FENTANYL OVERRIDDEN 
100MCG GIVEN IN 2 50MCG DOSES 5 MIN APART. TYLENOL IV ORDERED PT REFUSED. SURGICALLY STABLE.

## 2021-07-13 NOTE — NUR
Pt refused to sign DC paperwork after edmd Young explained there is no reason 
for admission. Dressing was changed. Pt requesting  consult 
which MD Young is aware of. QUETA Christian contacted [employer]. Pt put in 
lobby awaiting [employer].

## 2021-07-13 NOTE — NUR
NOTED THAT PATIENT HAS PO VALIUM ORDERED, VALIUM OFFERED BUT REFUSED. REINFORCED POSITIVE 
EFFECTS OF TAKING VALIUM, PT CONCEDES. VALIUM 5MG TAKEN PO. DRINKING 7UP WITHOUT NAUSEA.

## 2021-07-13 NOTE — NUR
PT STATES FENTANYL NOT WORKING, WANTS MORE MEDICATION. IV TYLENOL ORDERED BY ANESTHESIA. PT 
REFUSES IV TYLENOL STATING THAT IT DOESN'T WORK. THAT SHE IS IN TOO MUCH PAIN TO GO HOME DR. RESENDEZ CALLED IN OR ADVISED ON CURRENT SITUATION NO NEED TO ADMIT TO HOSPITAL. PT TOLD OF DR. RESENDEZ DECISION, STATES SHE IS NOT GOING HOME SHE WANTS TO BE ADMITTED TO HOSPITAL. UPDATE TO 
PACU CHARGE NURSE.

## 2021-07-13 NOTE — NUR
PACU DISCHARGE CRITERIA MET, REPORT GIVEN TO FLOOR.  DENIES PAIN OR DISCOMFORT. PT IS STABLE 
AND ADEQUATELY RECOVERED FROM ANESTHESIA. PT HAS STABLE AIRWAY PATENCY, RESPIRATORY FUNCTION 
TO INCLUDE RESPIRATORY RATE AND O2 SAT. HEART RATE, BLOOD PRESSURE STABLE AND HYDRATION 
ADEQUATE. MENTAL STATUS IS APPROPRIATE. PAIN AND NAUSEA CONTROLLED. REFER TO PACU 
SPREADSHEET FOR VITAL SIGNS.  DISCHARGE CRITERIA MET, DISCHARGE INSTRUCTIONS GIVEN.  
DISCHARGED HOME IN STABLE CONDITION.

## 2021-07-13 NOTE — NUR
PT CONTINUES TO INSIST ON NEED FOR MORE PAIN MEDS, VSS.  CALLED FOR DISCHARGE. PT 
REFUSED SALINE NASAL RINSE. REVIEWED DISCHARGE INSTRUCTIONS. PT STATES WHEN SHE IS 
DISCHARGED THAT SHE WILL HAVE HER  TAKE HER TO ER TO BE ADMITTED.

## 2021-08-09 ENCOUNTER — HOSPITAL ENCOUNTER (EMERGENCY)
Dept: HOSPITAL 94 - ER | Age: 41
Discharge: HOME | End: 2021-08-09
Payer: COMMERCIAL

## 2021-08-09 VITALS — DIASTOLIC BLOOD PRESSURE: 109 MMHG | SYSTOLIC BLOOD PRESSURE: 157 MMHG

## 2021-08-09 VITALS — BODY MASS INDEX: 39.24 KG/M2 | WEIGHT: 235.5 LBS | HEIGHT: 65 IN

## 2021-08-09 DIAGNOSIS — Z88.5: ICD-10-CM

## 2021-08-09 DIAGNOSIS — Z88.1: ICD-10-CM

## 2021-08-09 DIAGNOSIS — Z88.4: ICD-10-CM

## 2021-08-09 DIAGNOSIS — K21.9: ICD-10-CM

## 2021-08-09 DIAGNOSIS — Z79.899: ICD-10-CM

## 2021-08-09 DIAGNOSIS — R11.2: Primary | ICD-10-CM

## 2021-08-09 DIAGNOSIS — Z88.0: ICD-10-CM

## 2021-08-09 DIAGNOSIS — Z88.2: ICD-10-CM

## 2021-08-09 DIAGNOSIS — J32.9: ICD-10-CM

## 2021-08-09 PROCEDURE — 96374 THER/PROPH/DIAG INJ IV PUSH: CPT

## 2021-08-09 PROCEDURE — 70487 CT MAXILLOFACIAL W/DYE: CPT

## 2021-08-09 PROCEDURE — 96361 HYDRATE IV INFUSION ADD-ON: CPT

## 2021-08-09 PROCEDURE — 96375 TX/PRO/DX INJ NEW DRUG ADDON: CPT

## 2021-08-09 PROCEDURE — 99285 EMERGENCY DEPT VISIT HI MDM: CPT

## 2022-08-05 ENCOUNTER — HOSPITAL ENCOUNTER (EMERGENCY)
Dept: HOSPITAL 94 - ER | Age: 42
Discharge: HOME | End: 2022-08-05
Payer: COMMERCIAL

## 2022-08-05 VITALS — SYSTOLIC BLOOD PRESSURE: 155 MMHG | DIASTOLIC BLOOD PRESSURE: 97 MMHG

## 2022-08-05 VITALS — WEIGHT: 240.5 LBS | HEIGHT: 65 IN | BODY MASS INDEX: 40.07 KG/M2

## 2022-08-05 DIAGNOSIS — Z88.2: ICD-10-CM

## 2022-08-05 DIAGNOSIS — N39.0: Primary | ICD-10-CM

## 2022-08-05 DIAGNOSIS — K21.9: ICD-10-CM

## 2022-08-05 DIAGNOSIS — Z88.1: ICD-10-CM

## 2022-08-05 DIAGNOSIS — Z88.0: ICD-10-CM

## 2022-08-05 LAB
BACTERIA URNS QL MICRO: (no result) /HPF
CLARITY UR: (no result)
COLOR UR: YELLOW
DEPRECATED SQUAMOUS URNS QL MICRO: (no result) /LPF
GLUCOSE UR STRIP-MCNC: NEGATIVE MG/DL
HGB UR QL STRIP: NEGATIVE
KETONES UR STRIP-MCNC: NEGATIVE MG/DL
LEUKOCYTE ESTERASE UR QL STRIP: (no result)
MUCOUS THREADS URNS QL MICRO: (no result) /LPF
NITRITE UR QL STRIP: NEGATIVE
PH UR STRIP: 6 [PH] (ref 4.8–8)
PROT UR QL STRIP: NEGATIVE MG/DL
RBC #/AREA URNS HPF: (no result) /HPF (ref 0–2)
SP GR UR STRIP: 1.02 (ref 1–1.03)
URN COLLECT METHOD CLASS: (no result)
UROBILINOGEN UR STRIP-MCNC: 0.2 E.U/DL (ref 0.2–1)
WBC CLUMPS #/AREA URNS HPF: (no result) /HPF

## 2022-08-05 PROCEDURE — 81001 URINALYSIS AUTO W/SCOPE: CPT

## 2022-08-05 PROCEDURE — 99283 EMERGENCY DEPT VISIT LOW MDM: CPT

## 2023-05-24 ENCOUNTER — HOSPITAL ENCOUNTER (EMERGENCY)
Dept: HOSPITAL 94 - ER | Age: 43
Discharge: HOME | End: 2023-05-24
Payer: MEDICARE

## 2023-05-24 VITALS — SYSTOLIC BLOOD PRESSURE: 149 MMHG | DIASTOLIC BLOOD PRESSURE: 101 MMHG

## 2023-05-24 VITALS — BODY MASS INDEX: 43.41 KG/M2 | WEIGHT: 260.54 LBS | HEIGHT: 65 IN

## 2023-05-24 DIAGNOSIS — J06.9: ICD-10-CM

## 2023-05-24 DIAGNOSIS — J04.0: Primary | ICD-10-CM

## 2023-05-24 DIAGNOSIS — Z88.1: ICD-10-CM

## 2023-05-24 DIAGNOSIS — Z88.0: ICD-10-CM

## 2023-05-24 DIAGNOSIS — K21.9: ICD-10-CM

## 2023-05-24 DIAGNOSIS — Z20.822: ICD-10-CM

## 2023-05-24 DIAGNOSIS — Z79.899: ICD-10-CM

## 2023-05-24 DIAGNOSIS — Z88.5: ICD-10-CM

## 2023-05-24 DIAGNOSIS — F41.9: ICD-10-CM

## 2023-05-24 DIAGNOSIS — Z88.2: ICD-10-CM

## 2023-05-24 LAB — HETEROPH AB SER QL LA: NEGATIVE

## 2023-05-24 PROCEDURE — 99284 EMERGENCY DEPT VISIT MOD MDM: CPT

## 2023-05-24 PROCEDURE — 71045 X-RAY EXAM CHEST 1 VIEW: CPT

## 2023-05-24 PROCEDURE — 84484 ASSAY OF TROPONIN QUANT: CPT

## 2023-05-24 PROCEDURE — 87880 STREP A ASSAY W/OPTIC: CPT

## 2023-05-24 PROCEDURE — 99283 EMERGENCY DEPT VISIT LOW MDM: CPT

## 2023-05-24 PROCEDURE — 87081 CULTURE SCREEN ONLY: CPT

## 2023-05-24 PROCEDURE — 36415 COLL VENOUS BLD VENIPUNCTURE: CPT

## 2023-05-24 PROCEDURE — 86308 HETEROPHILE ANTIBODY SCREEN: CPT

## 2023-05-24 PROCEDURE — 87811 SARS-COV-2 COVID19 W/OPTIC: CPT

## 2023-10-07 ENCOUNTER — HOSPITAL ENCOUNTER (EMERGENCY)
Dept: HOSPITAL 94 - ER | Age: 43
Discharge: HOME | End: 2023-10-07
Payer: MEDICARE

## 2023-10-07 VITALS — BODY MASS INDEX: 41.73 KG/M2 | WEIGHT: 250.45 LBS | HEIGHT: 65 IN

## 2023-10-07 VITALS
RESPIRATION RATE: 18 BRPM | TEMPERATURE: 97.9 F | HEART RATE: 69 BPM | OXYGEN SATURATION: 99 % | SYSTOLIC BLOOD PRESSURE: 131 MMHG | DIASTOLIC BLOOD PRESSURE: 97 MMHG

## 2023-10-07 DIAGNOSIS — M25.531: Primary | ICD-10-CM

## 2023-10-07 DIAGNOSIS — Z98.890: ICD-10-CM

## 2023-10-07 DIAGNOSIS — Z88.8: ICD-10-CM

## 2023-10-07 DIAGNOSIS — Z79.899: ICD-10-CM

## 2023-10-07 DIAGNOSIS — Z79.2: ICD-10-CM
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
## 2023-10-07 DIAGNOSIS — Z88.1: ICD-10-CM

## 2023-10-07 DIAGNOSIS — Z98.891: ICD-10-CM

## 2023-10-07 DIAGNOSIS — Z91.81: ICD-10-CM

## 2023-10-07 DIAGNOSIS — Z88.2: ICD-10-CM
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
## 2023-10-07 DIAGNOSIS — Z88.0: ICD-10-CM

## 2023-10-07 DIAGNOSIS — M25.521: ICD-10-CM
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
## 2023-10-07 PROCEDURE — 73110 X-RAY EXAM OF WRIST: CPT

## 2023-10-07 PROCEDURE — 73080 X-RAY EXAM OF ELBOW: CPT

## 2023-10-07 PROCEDURE — 29125 APPL SHORT ARM SPLINT STATIC: CPT

## 2023-10-07 PROCEDURE — 73090 X-RAY EXAM OF FOREARM: CPT

## 2023-10-07 PROCEDURE — 99284 EMERGENCY DEPT VISIT MOD MDM: CPT

## 2023-10-07 NOTE — NUR
PT REPORTS TO THE ER WITH RIGHT ARM PAIN R/T FALLING IN SHOWER THIS MORNING. PT 
STATES PAIN 8/10 PAIN. PT REPORTS TINGLING IN FINGERS. Please take Reglan for nausea along with Benadryl and 1000 mg of Tylenol as needed for headache  You are diagnosed with a UTI as well, please  your antibiotics and take as prescribed until finished

## 2024-10-05 ENCOUNTER — HOSPITAL ENCOUNTER (EMERGENCY)
Dept: HOSPITAL 94 - ER | Age: 44
Discharge: HOME | End: 2024-10-05
Payer: MEDICARE

## 2024-10-05 VITALS — BODY MASS INDEX: 40.4 KG/M2 | WEIGHT: 242.51 LBS | HEIGHT: 65 IN

## 2024-10-05 VITALS
HEART RATE: 82 BPM | DIASTOLIC BLOOD PRESSURE: 83 MMHG | OXYGEN SATURATION: 97 % | RESPIRATION RATE: 16 BRPM | TEMPERATURE: 98.3 F | SYSTOLIC BLOOD PRESSURE: 139 MMHG

## 2024-10-05 DIAGNOSIS — Z88.5: ICD-10-CM

## 2024-10-05 DIAGNOSIS — Z88.0: ICD-10-CM

## 2024-10-05 DIAGNOSIS — K21.9: ICD-10-CM

## 2024-10-05 DIAGNOSIS — Z98.890: ICD-10-CM

## 2024-10-05 DIAGNOSIS — Z88.2: ICD-10-CM

## 2024-10-05 DIAGNOSIS — Z88.1: ICD-10-CM

## 2024-10-05 DIAGNOSIS — Z79.899: ICD-10-CM

## 2024-10-05 DIAGNOSIS — F41.9: ICD-10-CM

## 2024-10-05 DIAGNOSIS — Z90.89: ICD-10-CM

## 2024-10-05 DIAGNOSIS — K08.89: ICD-10-CM

## 2024-10-05 DIAGNOSIS — K04.7: Primary | ICD-10-CM

## 2024-10-05 DIAGNOSIS — Z88.8: ICD-10-CM

## 2024-10-05 PROCEDURE — 99283 EMERGENCY DEPT VISIT LOW MDM: CPT

## 2024-10-18 ENCOUNTER — HOSPITAL ENCOUNTER (EMERGENCY)
Dept: HOSPITAL 94 - ER | Age: 44
Discharge: HOME | End: 2024-10-18
Payer: MEDICARE

## 2024-10-18 VITALS
TEMPERATURE: 98.3 F | DIASTOLIC BLOOD PRESSURE: 104 MMHG | HEART RATE: 82 BPM | RESPIRATION RATE: 17 BRPM | SYSTOLIC BLOOD PRESSURE: 153 MMHG | OXYGEN SATURATION: 99 %

## 2024-10-18 VITALS — HEIGHT: 65 IN | BODY MASS INDEX: 40.04 KG/M2 | WEIGHT: 240.3 LBS

## 2024-10-18 DIAGNOSIS — Z79.899: ICD-10-CM

## 2024-10-18 DIAGNOSIS — I95.1: ICD-10-CM

## 2024-10-18 DIAGNOSIS — K21.9: ICD-10-CM

## 2024-10-18 DIAGNOSIS — Z98.890: ICD-10-CM

## 2024-10-18 DIAGNOSIS — K08.89: ICD-10-CM

## 2024-10-18 DIAGNOSIS — R42: Primary | ICD-10-CM

## 2024-10-18 DIAGNOSIS — Z88.0: ICD-10-CM

## 2024-10-18 DIAGNOSIS — F41.9: ICD-10-CM

## 2024-10-18 DIAGNOSIS — Z79.52: ICD-10-CM

## 2024-10-18 DIAGNOSIS — Z88.1: ICD-10-CM

## 2024-10-18 DIAGNOSIS — Z88.2: ICD-10-CM

## 2024-10-18 LAB
ALBUMIN SERPL BCP-MCNC: 3.2 G/DL (ref 3.4–5)
ANION GAP SERPL CALCULATED.3IONS-SCNC: 5 MMOL/L (ref 8–16)
BASOPHILS # BLD AUTO: 0 X10'3 (ref 0–0.2)
BASOPHILS NFR BLD AUTO: 0.3 % (ref 0–1)
BUN SERPL-MCNC: 13 MG/DL (ref 7–18)
BUN/CREAT SERPL: 14.8 (ref 10–20)
CALCIUM SERPL-MCNC: 8.5 MG/DL (ref 8.5–10.1)
CHLORIDE SERPL-SCNC: 102 MMOL/L (ref 99–107)
CO2 SERPL-SCNC: 30.1 MMOL/L (ref 24–32)
CREAT CL PREDICTED SERPL C-G-VRATE: 73 ML/MIN
CREAT SERPL-MCNC: 0.88 MG/DL (ref 0.4–0.9)
EOSINOPHIL # BLD AUTO: 0.1 X10'3 (ref 0–0.9)
EOSINOPHIL NFR BLD AUTO: 1.5 % (ref 0–6)
ERYTHROCYTE [DISTWIDTH] IN BLOOD BY AUTOMATED COUNT: 13.9 % (ref 11.5–14.5)
GFR SERPL CREATININE-BSD FRML MDRD: 70 ML/MIN
GLUCOSE SERPL-MCNC: 114 MG/DL (ref 70–104)
HCT VFR BLD AUTO: 39 % (ref 35–45)
HGB BLD-MCNC: 12.9 G/DL (ref 12–16)
LYMPHOCYTES # BLD AUTO: 1.9 X10'3 (ref 1.1–4.8)
LYMPHOCYTES NFR BLD AUTO: 19.2 % (ref 21–51)
MCH RBC QN AUTO: 29.4 PG (ref 27–31)
MCHC RBC AUTO-ENTMCNC: 33.2 G/DL (ref 33–36.5)
MCV RBC AUTO: 88.6 FL (ref 78–98)
MONOCYTES # BLD AUTO: 0.6 X10'3 (ref 0–0.9)
MONOCYTES NFR BLD AUTO: 5.6 % (ref 2–12)
NEUTROPHILS # BLD AUTO: 7.4 X10'3 (ref 1.8–7.7)
NEUTROPHILS NFR BLD AUTO: 73.4 % (ref 42–75)
PLATELET # BLD AUTO: 305 X10'3 (ref 140–440)
PMV BLD AUTO: 8 FL (ref 7.4–10.4)
POTASSIUM SERPL-SCNC: 3.9 MMOL/L (ref 3.5–5.1)
RBC # BLD AUTO: 4.4 X10'6 (ref 4.2–5.6)
SODIUM SERPL-SCNC: 137 MMOL/L (ref 135–145)
WBC # BLD AUTO: 10.1 X10'3 (ref 4.5–11)

## 2024-10-18 PROCEDURE — 99283 EMERGENCY DEPT VISIT LOW MDM: CPT

## 2024-10-18 PROCEDURE — 36415 COLL VENOUS BLD VENIPUNCTURE: CPT

## 2024-10-18 PROCEDURE — 80048 BASIC METABOLIC PNL TOTAL CA: CPT

## 2024-10-18 PROCEDURE — 85025 COMPLETE CBC W/AUTO DIFF WBC: CPT

## 2024-12-18 ENCOUNTER — HOSPITAL ENCOUNTER (EMERGENCY)
Dept: HOSPITAL 94 - ER | Age: 44
Discharge: HOME | End: 2024-12-18
Payer: MEDICARE

## 2024-12-18 VITALS — BODY MASS INDEX: 32.84 KG/M2 | WEIGHT: 216.71 LBS | HEIGHT: 68 IN

## 2024-12-18 VITALS
OXYGEN SATURATION: 98 % | DIASTOLIC BLOOD PRESSURE: 87 MMHG | RESPIRATION RATE: 16 BRPM | HEART RATE: 79 BPM | SYSTOLIC BLOOD PRESSURE: 138 MMHG

## 2024-12-18 VITALS — TEMPERATURE: 97.8 F

## 2024-12-18 DIAGNOSIS — F41.9: ICD-10-CM
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
## 2024-12-18 DIAGNOSIS — Z88.1: ICD-10-CM
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
## 2024-12-18 DIAGNOSIS — Z88.8: ICD-10-CM

## 2024-12-18 DIAGNOSIS — Z98.890: ICD-10-CM
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
## 2024-12-18 DIAGNOSIS — Z88.5: ICD-10-CM

## 2024-12-18 DIAGNOSIS — Z88.2: ICD-10-CM

## 2024-12-18 DIAGNOSIS — Z90.89: ICD-10-CM

## 2024-12-18 DIAGNOSIS — M54.50: Primary | ICD-10-CM

## 2024-12-18 DIAGNOSIS — Z88.0: ICD-10-CM

## 2024-12-18 DIAGNOSIS — K21.9: ICD-10-CM
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
## 2024-12-18 LAB
ALBUMIN SERPL BCP-MCNC: 3.7 G/DL (ref 3.4–5)
ALBUMIN/GLOB SERPL: 1.1 {RATIO} (ref 1.1–1.5)
ALP SERPL-CCNC: 70 IU/L (ref 46–116)
ALT SERPL W P-5'-P-CCNC: 36 U/L (ref 12–78)
ANION GAP SERPL CALCULATED.3IONS-SCNC: 8 MMOL/L (ref 8–16)
AST SERPL W P-5'-P-CCNC: 32 U/L (ref 10–37)
BASOPHILS # BLD AUTO: 0.1 X10'3 (ref 0–0.2)
BASOPHILS NFR BLD AUTO: 0.6 % (ref 0–1)
BILIRUB SERPL-MCNC: 0.5 MG/DL (ref 0.1–1)
BUN SERPL-MCNC: 14 MG/DL (ref 7–18)
BUN/CREAT SERPL: 12.7 (ref 10–20)
CALCIUM SERPL-MCNC: 8.4 MG/DL (ref 8.5–10.1)
CHLORIDE SERPL-SCNC: 107 MMOL/L (ref 99–107)
CO2 SERPL-SCNC: 28.5 MMOL/L (ref 24–32)
CREAT CL PREDICTED SERPL C-G-VRATE: 65 ML/MIN
CREAT SERPL-MCNC: 1.1 MG/DL (ref 0.4–0.9)
EOSINOPHIL # BLD AUTO: 0.2 X10'3 (ref 0–0.9)
EOSINOPHIL NFR BLD AUTO: 1.9 % (ref 0–6)
ERYTHROCYTE [DISTWIDTH] IN BLOOD BY AUTOMATED COUNT: 13.4 % (ref 11.5–14.5)
GFR SERPL CREATININE-BSD FRML MDRD: 54 ML/MIN
GLOBULIN SER CALC-MCNC: 3.5 G/DL (ref 2.7–4.3)
GLUCOSE SERPL-MCNC: 120 MG/DL (ref 70–104)
HCT VFR BLD AUTO: 37.6 % (ref 35–45)
HGB BLD-MCNC: 13 G/DL (ref 12–16)
LYMPHOCYTES # BLD AUTO: 2.6 X10'3 (ref 1.1–4.8)
LYMPHOCYTES NFR BLD AUTO: 29.3 % (ref 21–51)
MCH RBC QN AUTO: 30.5 PG (ref 27–31)
MCHC RBC AUTO-ENTMCNC: 34.5 G/DL (ref 33–36.5)
MCV RBC AUTO: 88.3 FL (ref 78–98)
MONOCYTES # BLD AUTO: 0.8 X10'3 (ref 0–0.9)
MONOCYTES NFR BLD AUTO: 9.1 % (ref 2–12)
NEUTROPHILS # BLD AUTO: 5.2 X10'3 (ref 1.8–7.7)
NEUTROPHILS NFR BLD AUTO: 59.1 % (ref 42–75)
PLATELET # BLD AUTO: 301 X10'3 (ref 140–440)
PMV BLD AUTO: 7.9 FL (ref 7.4–10.4)
POTASSIUM SERPL-SCNC: 2.9 MMOL/L (ref 3.5–5.1)
PROT SERPL-MCNC: 7.2 G/DL (ref 6.4–8.2)
RBC # BLD AUTO: 4.26 X10'6 (ref 4.2–5.6)
SODIUM SERPL-SCNC: 143 MMOL/L (ref 135–145)
WBC # BLD AUTO: 8.8 X10'3 (ref 4.5–11)

## 2024-12-18 PROCEDURE — 80053 COMPREHEN METABOLIC PANEL: CPT

## 2024-12-18 PROCEDURE — 85025 COMPLETE CBC W/AUTO DIFF WBC: CPT

## 2024-12-18 PROCEDURE — 99285 EMERGENCY DEPT VISIT HI MDM: CPT

## 2024-12-18 PROCEDURE — 72131 CT LUMBAR SPINE W/O DYE: CPT

## 2024-12-18 PROCEDURE — 96374 THER/PROPH/DIAG INJ IV PUSH: CPT

## 2024-12-18 PROCEDURE — 36415 COLL VENOUS BLD VENIPUNCTURE: CPT

## 2024-12-18 RX ADMIN — ORPHENADRINE CITRATE ONE MG: 30 INJECTION, SOLUTION INTRAMUSCULAR; INTRAVENOUS at 19:53

## 2024-12-18 RX ADMIN — FENTANYL CITRATE ONE MCG: 50 INJECTION, SOLUTION INTRAMUSCULAR; INTRAVENOUS at 19:41

## 2024-12-18 RX ADMIN — POTASSIUM CHLORIDE STA MEQ: 1500 TABLET, EXTENDED RELEASE ORAL at 20:35

## 2025-03-22 ENCOUNTER — HOSPITAL ENCOUNTER (EMERGENCY)
Dept: HOSPITAL 94 - ER | Age: 45
LOS: 1 days | Discharge: HOME | End: 2025-03-23
Payer: MEDICARE

## 2025-03-22 VITALS — BODY MASS INDEX: 38.78 KG/M2 | WEIGHT: 232.76 LBS | HEIGHT: 65 IN

## 2025-03-22 DIAGNOSIS — Z88.5: ICD-10-CM

## 2025-03-22 DIAGNOSIS — Z88.0: ICD-10-CM

## 2025-03-22 DIAGNOSIS — K21.9: ICD-10-CM

## 2025-03-22 DIAGNOSIS — R11.2: ICD-10-CM

## 2025-03-22 DIAGNOSIS — Z88.1: ICD-10-CM

## 2025-03-22 DIAGNOSIS — Z88.2: ICD-10-CM

## 2025-03-22 DIAGNOSIS — R10.13: Primary | ICD-10-CM

## 2025-03-22 DIAGNOSIS — Z98.890: ICD-10-CM

## 2025-03-22 DIAGNOSIS — F41.9: ICD-10-CM

## 2025-03-22 DIAGNOSIS — Z88.8: ICD-10-CM

## 2025-03-22 DIAGNOSIS — Z79.899: ICD-10-CM

## 2025-03-22 LAB
ALBUMIN SERPL BCP-MCNC: 3.7 G/DL (ref 3.4–5)
ALBUMIN/GLOB SERPL: 0.9 {RATIO} (ref 1.1–1.5)
ALP SERPL-CCNC: 82 IU/L (ref 46–116)
ALT SERPL W P-5'-P-CCNC: 45 U/L (ref 12–78)
ANION GAP SERPL CALCULATED.3IONS-SCNC: 9 MMOL/L (ref 8–16)
AST SERPL W P-5'-P-CCNC: 26 U/L (ref 10–37)
BASOPHILS # BLD AUTO: 0 X10'3 (ref 0–0.2)
BASOPHILS NFR BLD AUTO: 0.5 % (ref 0–1)
BILIRUB SERPL-MCNC: 0.3 MG/DL (ref 0.1–1)
BUN SERPL-MCNC: 6 MG/DL (ref 7–18)
BUN/CREAT SERPL: 8.1 (ref 10–20)
CALCIUM SERPL-MCNC: 8.7 MG/DL (ref 8.5–10.1)
CHLORIDE SERPL-SCNC: 104 MMOL/L (ref 99–107)
CO2 SERPL-SCNC: 28.1 MMOL/L (ref 24–32)
CREAT CL PREDICTED SERPL C-G-VRATE: 86 ML/MIN
CREAT SERPL-MCNC: 0.74 MG/DL (ref 0.4–0.9)
EOSINOPHIL # BLD AUTO: 0.2 X10'3 (ref 0–0.9)
EOSINOPHIL NFR BLD AUTO: 2 % (ref 0–6)
ERYTHROCYTE [DISTWIDTH] IN BLOOD BY AUTOMATED COUNT: 14.1 % (ref 11.5–14.5)
GFR SERPL CREATININE-BSD FRML MDRD: 85 ML/MIN
GLOBULIN SER CALC-MCNC: 4.2 G/DL (ref 2.7–4.3)
GLUCOSE SERPL-MCNC: 84 MG/DL (ref 70–104)
HCT VFR BLD AUTO: 40.5 % (ref 35–45)
HGB BLD-MCNC: 13.6 G/DL (ref 12–16)
LIPASE SERPL-CCNC: 30 U/L (ref 16–77)
LYMPHOCYTES # BLD AUTO: 2.7 X10'3 (ref 1.1–4.8)
LYMPHOCYTES NFR BLD AUTO: 28.1 % (ref 21–51)
MCH RBC QN AUTO: 29.5 PG (ref 27–31)
MCHC RBC AUTO-ENTMCNC: 33.6 G/DL (ref 33–36.5)
MCV RBC AUTO: 87.8 FL (ref 78–98)
MONOCYTES # BLD AUTO: 0.9 X10'3 (ref 0–0.9)
MONOCYTES NFR BLD AUTO: 8.9 % (ref 2–12)
NEUTROPHILS # BLD AUTO: 5.9 X10'3 (ref 1.8–7.7)
NEUTROPHILS NFR BLD AUTO: 60.5 % (ref 42–75)
PLATELET # BLD AUTO: 313 X10'3 (ref 140–440)
PMV BLD AUTO: 8.6 FL (ref 7.4–10.4)
POTASSIUM SERPL-SCNC: 3.3 MMOL/L (ref 3.5–5.1)
PROT SERPL-MCNC: 7.9 G/DL (ref 6.4–8.2)
RBC # BLD AUTO: 4.62 X10'6 (ref 4.2–5.6)
SODIUM SERPL-SCNC: 141 MMOL/L (ref 135–145)
WBC # BLD AUTO: 9.8 X10'3 (ref 4.5–11)

## 2025-03-22 PROCEDURE — 99283 EMERGENCY DEPT VISIT LOW MDM: CPT

## 2025-03-22 PROCEDURE — 81025 URINE PREGNANCY TEST: CPT

## 2025-03-22 PROCEDURE — 83690 ASSAY OF LIPASE: CPT

## 2025-03-22 PROCEDURE — 81003 URINALYSIS AUTO W/O SCOPE: CPT

## 2025-03-22 PROCEDURE — 36415 COLL VENOUS BLD VENIPUNCTURE: CPT

## 2025-03-22 PROCEDURE — 85025 COMPLETE CBC W/AUTO DIFF WBC: CPT

## 2025-03-22 PROCEDURE — 80053 COMPREHEN METABOLIC PANEL: CPT

## 2025-03-23 VITALS
RESPIRATION RATE: 16 BRPM | OXYGEN SATURATION: 97 % | SYSTOLIC BLOOD PRESSURE: 141 MMHG | TEMPERATURE: 98.2 F | DIASTOLIC BLOOD PRESSURE: 92 MMHG

## 2025-03-23 VITALS — HEART RATE: 93 BPM

## 2025-03-23 LAB
BILIRUB UR QL STRIP: NEGATIVE
CLARITY UR: CLEAR
COLOR UR: YELLOW
GLUCOSE UR STRIP-MCNC: NEGATIVE MG/DL
HCG UR QL: NEGATIVE
HGB UR QL STRIP: NEGATIVE
KETONES UR STRIP-MCNC: NEGATIVE MG/DL
LEUKOCYTE ESTERASE UR QL STRIP: NEGATIVE
NITRITE UR QL STRIP: NEGATIVE
PH UR STRIP: 6 [PH] (ref 4.8–8)
PROT UR QL STRIP: NEGATIVE MG/DL
SP GR UR STRIP: 1.01 (ref 1–1.03)
URN COLLECT METHOD CLASS: (no result)
UROBILINOGEN UR STRIP-MCNC: 0.2 E.U/DL (ref 0.2–1)

## 2025-05-13 ENCOUNTER — HOSPITAL ENCOUNTER (EMERGENCY)
Dept: HOSPITAL 94 - ER | Age: 45
LOS: 1 days | Discharge: HOME | End: 2025-05-14
Payer: MEDICARE

## 2025-05-13 DIAGNOSIS — Z90.89: ICD-10-CM

## 2025-05-13 DIAGNOSIS — Z98.890: ICD-10-CM

## 2025-05-13 DIAGNOSIS — Z88.5: ICD-10-CM

## 2025-05-13 DIAGNOSIS — K21.9: ICD-10-CM

## 2025-05-13 DIAGNOSIS — Z88.1: ICD-10-CM

## 2025-05-13 DIAGNOSIS — Z88.8: ICD-10-CM

## 2025-05-13 DIAGNOSIS — R10.84: Primary | ICD-10-CM

## 2025-05-13 DIAGNOSIS — Z88.0: ICD-10-CM

## 2025-05-13 DIAGNOSIS — Z88.2: ICD-10-CM

## 2025-05-13 LAB
ALBUMIN SERPL BCP-MCNC: 3.8 G/DL (ref 3.4–5)
ALP SERPL-CCNC: 84 IU/L (ref 46–116)
ALT SERPL W P-5'-P-CCNC: 26 U/L (ref 12–78)
ANION GAP SERPL CALCULATED.3IONS-SCNC: 5 MMOL/L (ref 8–16)
AST SERPL W P-5'-P-CCNC: 24 U/L (ref 10–37)
BACTERIA URNS QL MICRO: (no result) /HPF
BASOPHILS # BLD AUTO: 0.1 X10'3 (ref 0–0.2)
BASOPHILS NFR BLD AUTO: 0.9 % (ref 0–1)
BILIRUB SERPL-MCNC: 0.3 MG/DL (ref 0.1–1)
BILIRUB UR QL STRIP: NEGATIVE
BUN SERPL-MCNC: 12 MG/DL (ref 7–18)
BUN/CREAT SERPL: 11.1 (ref 10–20)
CALCIUM SERPL-MCNC: 8.8 MG/DL (ref 8.5–10.1)
CHLORIDE SERPL-SCNC: 105 MMOL/L (ref 99–107)
CLARITY UR: CLEAR
COLOR UR: YELLOW
CREAT CL PREDICTED SERPL C-G-VRATE: 59 ML/MIN
CREAT SERPL-MCNC: 1.08 MG/DL (ref 0.4–0.9)
DEPRECATED SQUAMOUS URNS QL MICRO: (no result) /LPF
EOSINOPHIL # BLD AUTO: 0.2 X10'3 (ref 0–0.9)
EOSINOPHIL NFR BLD AUTO: 2.4 % (ref 0–6)
ERYTHROCYTE [DISTWIDTH] IN BLOOD BY AUTOMATED COUNT: 14.1 % (ref 11.5–14.5)
GFR SERPL CREATININE-BSD FRML MDRD: 55 ML/MIN
GLOBULIN SER CALC-MCNC: 3.7 G/DL (ref 2.7–4.3)
GLUCOSE SERPL-MCNC: 93 MG/DL (ref 70–104)
GLUCOSE UR STRIP-MCNC: NEGATIVE MG/DL
HCG UR QL: NEGATIVE
HCT VFR BLD AUTO: 40.4 % (ref 35–45)
HGB BLD-MCNC: 13.8 G/DL (ref 12–16)
HGB UR QL STRIP: (no result)
HYALINE CASTS URNS QL MICRO: (no result) /LPF
KETONES UR STRIP-MCNC: NEGATIVE MG/DL
LEUKOCYTE ESTERASE UR QL STRIP: NEGATIVE
LIPASE SERPL-CCNC: 37 U/L (ref 16–77)
LYMPHOCYTES # BLD AUTO: 2.7 X10'3 (ref 1.1–4.8)
LYMPHOCYTES NFR BLD AUTO: 36.4 % (ref 21–51)
MCH RBC QN AUTO: 29.6 PG (ref 27–31)
MCHC RBC AUTO-ENTMCNC: 34.3 G/DL (ref 33–36.5)
MCV RBC AUTO: 86.3 FL (ref 78–98)
MONOCYTES # BLD AUTO: 0.6 X10'3 (ref 0–0.9)
MONOCYTES NFR BLD AUTO: 8.3 % (ref 2–12)
NEUTROPHILS # BLD AUTO: 3.9 X10'3 (ref 1.8–7.7)
NITRITE UR QL STRIP: NEGATIVE
PLATELET # BLD AUTO: 314 X10'3 (ref 140–440)
PMV BLD AUTO: 7.9 FL (ref 7.4–10.4)
POTASSIUM SERPL-SCNC: 3.7 MMOL/L (ref 3.5–5.1)
PROT SERPL-MCNC: 7.5 G/DL (ref 6.4–8.2)
PROT UR QL STRIP: NEGATIVE MG/DL
RBC # BLD AUTO: 4.68 X10'6 (ref 4.2–5.6)
SODIUM SERPL-SCNC: 141 MMOL/L (ref 135–145)
SP GR UR STRIP: 1.01 (ref 1–1.03)
URN COLLECT METHOD CLASS: (no result)
UROBILINOGEN UR STRIP-MCNC: 0.2 E.U/DL (ref 0.2–1)
WBC # BLD AUTO: 7.4 X10'3 (ref 4.5–11)
WBC #/AREA URNS HPF: (no result) /HPF (ref 0–4)

## 2025-05-13 PROCEDURE — 83690 ASSAY OF LIPASE: CPT

## 2025-05-13 PROCEDURE — 76700 US EXAM ABDOM COMPLETE: CPT

## 2025-05-13 PROCEDURE — 99285 EMERGENCY DEPT VISIT HI MDM: CPT

## 2025-05-13 PROCEDURE — 80053 COMPREHEN METABOLIC PANEL: CPT

## 2025-05-13 PROCEDURE — 93005 ELECTROCARDIOGRAM TRACING: CPT

## 2025-05-13 PROCEDURE — 36415 COLL VENOUS BLD VENIPUNCTURE: CPT

## 2025-05-13 PROCEDURE — 81001 URINALYSIS AUTO W/SCOPE: CPT

## 2025-05-13 PROCEDURE — 81025 URINE PREGNANCY TEST: CPT

## 2025-05-13 PROCEDURE — 85025 COMPLETE CBC W/AUTO DIFF WBC: CPT

## 2025-05-13 PROCEDURE — 74176 CT ABD & PELVIS W/O CONTRAST: CPT

## 2025-05-14 VITALS
SYSTOLIC BLOOD PRESSURE: 168 MMHG | OXYGEN SATURATION: 100 % | DIASTOLIC BLOOD PRESSURE: 87 MMHG | TEMPERATURE: 97.6 F | RESPIRATION RATE: 14 BRPM | HEART RATE: 68 BPM